# Patient Record
Sex: FEMALE | Race: WHITE | Employment: UNEMPLOYED | URBAN - METROPOLITAN AREA
[De-identification: names, ages, dates, MRNs, and addresses within clinical notes are randomized per-mention and may not be internally consistent; named-entity substitution may affect disease eponyms.]

---

## 2019-04-05 LAB
ALBUMIN SERPL-MCNC: NORMAL G/DL
ALP BLD-CCNC: NORMAL U/L
ALT SERPL-CCNC: NORMAL U/L
ANION GAP SERPL CALCULATED.3IONS-SCNC: NORMAL MMOL/L
AST SERPL-CCNC: NORMAL U/L
AVERAGE GLUCOSE: NORMAL
BILIRUB SERPL-MCNC: NORMAL MG/DL
BUN BLDV-MCNC: NORMAL MG/DL
CALCIUM SERPL-MCNC: NORMAL MG/DL
CHLORIDE BLD-SCNC: NORMAL MMOL/L
CHOLESTEROL, TOTAL: NORMAL
CHOLESTEROL/HDL RATIO: NORMAL
CO2: NORMAL
CREAT SERPL-MCNC: NORMAL MG/DL
GFR CALCULATED: NORMAL
GLUCOSE BLD-MCNC: NORMAL MG/DL
HBA1C MFR BLD: 5.1 %
HDLC SERPL-MCNC: NORMAL MG/DL
LDL CHOLESTEROL CALCULATED: NORMAL
POTASSIUM SERPL-SCNC: NORMAL MMOL/L
SODIUM BLD-SCNC: NORMAL MMOL/L
TOTAL PROTEIN: NORMAL
TRIGL SERPL-MCNC: NORMAL MG/DL
VLDLC SERPL CALC-MCNC: NORMAL MG/DL

## 2019-05-06 ENCOUNTER — HOSPITAL ENCOUNTER (OUTPATIENT)
Dept: MAMMOGRAPHY | Age: 39
Discharge: HOME | End: 2019-05-08
Payer: OTHER GOVERNMENT

## 2019-05-06 DIAGNOSIS — Z12.31 VISIT FOR SCREENING MAMMOGRAM: ICD-10-CM

## 2019-05-06 PROCEDURE — 77063 BREAST TOMOSYNTHESIS BI: CPT

## 2019-05-07 ENCOUNTER — TELEPHONE (OUTPATIENT)
Dept: FAMILY MEDICINE CLINIC | Age: 39
End: 2019-05-07

## 2019-05-07 NOTE — TELEPHONE ENCOUNTER
----- Message from Max Garcia DO sent at 5/7/2019  1:14 PM EDT -----  Mammogram normal ok to notify her

## 2019-11-20 RX ORDER — B-COMPLEX WITH VITAMIN C
TABLET ORAL DAILY
COMMUNITY
End: 2021-09-29

## 2019-11-20 RX ORDER — ASCORBIC ACID 500 MG
500 TABLET ORAL 2 TIMES DAILY
COMMUNITY
End: 2019-12-03

## 2019-11-20 RX ORDER — OXYBUTYNIN CHLORIDE 5 MG/1
5 TABLET ORAL 2 TIMES DAILY
COMMUNITY
End: 2019-12-03

## 2019-11-20 RX ORDER — VITAMIN B COMPLEX
1000 TABLET ORAL DAILY
COMMUNITY
End: 2022-06-28

## 2019-12-03 ENCOUNTER — OFFICE VISIT (OUTPATIENT)
Dept: PODIATRY | Age: 39
End: 2019-12-03
Payer: OTHER GOVERNMENT

## 2019-12-03 VITALS — BODY MASS INDEX: 21.19 KG/M2 | HEIGHT: 67 IN | WEIGHT: 135 LBS

## 2019-12-03 DIAGNOSIS — M76.61 ACHILLES TENDINITIS, RIGHT LEG: ICD-10-CM

## 2019-12-03 DIAGNOSIS — M79.671 RIGHT FOOT PAIN: Primary | ICD-10-CM

## 2019-12-03 DIAGNOSIS — M72.2 PLANTAR FASCIITIS: ICD-10-CM

## 2019-12-03 PROCEDURE — 99203 OFFICE O/P NEW LOW 30 MIN: CPT | Performed by: PODIATRIST

## 2019-12-03 PROCEDURE — L4396 STATIC OR DYNAMI AFO PRE CST: HCPCS | Performed by: PODIATRIST

## 2019-12-03 RX ORDER — MELOXICAM 15 MG/1
15 TABLET ORAL DAILY
Qty: 30 TABLET | Refills: 0 | Status: SHIPPED
Start: 2019-12-03 | End: 2020-05-28

## 2019-12-17 ENCOUNTER — OFFICE VISIT (OUTPATIENT)
Dept: PODIATRY | Age: 39
End: 2019-12-17
Payer: OTHER GOVERNMENT

## 2019-12-17 VITALS — BODY MASS INDEX: 21.19 KG/M2 | WEIGHT: 135 LBS | HEIGHT: 67 IN

## 2019-12-17 DIAGNOSIS — M72.2 PLANTAR FASCIITIS: Primary | ICD-10-CM

## 2019-12-17 DIAGNOSIS — M79.671 RIGHT FOOT PAIN: ICD-10-CM

## 2019-12-17 DIAGNOSIS — G57.91 NERVE ENTRAPMENT SYNDROME OF RIGHT FOOT: ICD-10-CM

## 2019-12-17 PROCEDURE — 99213 OFFICE O/P EST LOW 20 MIN: CPT | Performed by: PODIATRIST

## 2020-01-07 ENCOUNTER — OFFICE VISIT (OUTPATIENT)
Dept: PODIATRY | Age: 40
End: 2020-01-07
Payer: OTHER GOVERNMENT

## 2020-01-07 PROCEDURE — 99213 OFFICE O/P EST LOW 20 MIN: CPT | Performed by: PODIATRIST

## 2020-01-07 NOTE — PROGRESS NOTES
Patient in office to follow up with right heel pain. Continues to have pain to right heel but states it is not as bad as it was. Uses night splint at night. 12/3/19  Kayleigh Cline : 1980 Sex: female  Age: 35 y.o. Patient was referred by No primary care provider on file. CC:    Follow-up painful right heel and nerve pain on top of the right foot    HPI:   Follow-up right plantar fasciitis. Continues night splint has noted significant improvement. States at least 80% improved from last month. States she does not have as much nerve pain on top of the right foot since loosening the strap on the night splint. Does have oral Mobic which she has been taking only as needed at this time. States still having pain when she is barefoot. States first few steps in the morning significant decrease in overall pain pleased with overall progression. No additional pedal complaints at this time. ROS:  Const: Denies constitutional symptoms  Musculo: Denies symptoms other than stated above  Skin: Denies symptoms other than stated above       Current Outpatient Medications:     meloxicam (MOBIC) 15 MG tablet, Take 1 tablet by mouth daily for 30 doses, Disp: 30 tablet, Rfl: 0    Probiotic Product (PROBIOTIC PO), Take by mouth daily 1 by mouth everyday, Disp: , Rfl:     Vitamin D (CHOLECALCIFEROL) 25 MCG (1000 UT) TABS tablet, Take 1,000 Units by mouth daily 3 to 5 by mouth everyday, Disp: , Rfl:     B Complex Vitamins (VITAMIN B COMPLEX) TABS, Take by mouth daily 1 by mouth everyday vitamin b12 deficiency, Disp: , Rfl:   No Known Allergies    Past Medical History:   Diagnosis Date    HPV (human papilloma virus) anogenital infection     Positive    Hypothyroidism     Incontinence     Urge    Inguinal hernia 2013    Bilateral Repair with Umbilical Hernia Repair            There were no vitals filed for this visit. 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     Focused Lower Extremity Physical Exam:    Neurovascular examination:    Dorsalis Pedis palpable bilateral.  Posterior tibialis palpable bilateral.    Capillary Refill Time:  Immediate return  Hair growth:  Symmetrical and bilateral   Skin:  Not atrophic  Edema: No edema bilateral feet or ankles. Neurologic:  Light touch intact bilateral.   Negative Tinel's dorsal medial cutaneous nerve right great toe at the level of the first and second tarsometatarsal joint right foot. Musculoskeletal/ Orthopedic examination:    Equinis: Absent bilateral  Dorsiflexion, plantarflexion, inversion, eversion bilateral 5 out of 5 muscle strength  Wiggling toes  Negative Homans  Tenderness insertion right Achilles tendon. Negative Marcelo. Mild tenderness medial and central tubercle right plantar fascial.  Negative calcaneal squeeze test.    Dermatology examination:    No open skin lesions or abrasions bilateral lower extremity. Assessment and Plan:  Akiko Arciniega was seen today for follow-up and foot pain. Diagnoses and all orders for this visit:    Plantar fasciitis    Right foot pain    Nerve entrapment syndrome of right foot      Follow-up right plantar fasciitis  Continue night splint at night. Proper instructions. They recommend wearing a sock underneath the strap to avoid nerve compression. Avoid tight fitting shoes. Continue oral Mobic 15 mg once daily as directed for the next week. Risk and benefits long-term anti-inflammatories discussed in detail. I will follow-up as needed. Patient has progressed very well. Did discuss corticosteroid injection right plantar fascia if continued symptoms. I will follow-up as needed. Return if symptoms worsen or fail to improve. Seen By:  Trevor Bella DPM      Document was created using voice recognition software. Note was reviewed, however may contain grammatical errors.

## 2020-05-28 ENCOUNTER — VIRTUAL VISIT (OUTPATIENT)
Dept: FAMILY MEDICINE CLINIC | Age: 40
End: 2020-05-28
Payer: OTHER GOVERNMENT

## 2020-05-28 VITALS — HEIGHT: 68 IN | BODY MASS INDEX: 20.53 KG/M2

## 2020-05-28 PROCEDURE — 99213 OFFICE O/P EST LOW 20 MIN: CPT | Performed by: FAMILY MEDICINE

## 2020-05-28 RX ORDER — PREDNISONE 10 MG/1
20 TABLET ORAL 2 TIMES DAILY
Qty: 20 TABLET | Refills: 0 | Status: SHIPPED | OUTPATIENT
Start: 2020-05-28 | End: 2020-06-02

## 2020-05-28 ASSESSMENT — ENCOUNTER SYMPTOMS
ABDOMINAL PAIN: 0
WHEEZING: 0
CHEST TIGHTNESS: 0
NAUSEA: 0
DIARRHEA: 0
SINUS PAIN: 0
EYE PAIN: 0
BACK PAIN: 0
SORE THROAT: 0
VOMITING: 0
CONSTIPATION: 0
TROUBLE SWALLOWING: 0
COUGH: 0
SHORTNESS OF BREATH: 0

## 2020-05-28 NOTE — PROGRESS NOTES
20    Name: Rodney Almanza  :1980   Sex:female   Age:40 y.o. Chief Complaint   Patient presents with    Rash     Patient presents from home for virtual visit. She says she pulled poison ivy about a week ago. She now has a rash on arms and shoulders. She was taking Zyrtec with no relief. Patient denies shortness of breath, tongue swelling, and difficulty breathing. TeleMedicine Video Visit    This visit was performed as a virtual video visit using a synchronous, two-way, audio-video telehealth technology platform. Patient identification was verified at the start of the visit, including the patient's telephone number and physical location. I discussed with the patient the nature of our telehealth visits, that:     Due to the nature of an audio- video modality, the only components of a physical exam that could be done are the elements supported by direct observation. I would evaluate the patient and recommend diagnostics and treatments based on my assessment. If it was felt that the patient should be evaluated in clinic or an emergency room setting, then they would be directed there. Our sessions are not being recorded and that personal health information is protected. Our team would provide follow up care in person if/when the patient needs it. Patient does agree to proceed with telemedicine consultation. Patient's location: home address in [place] [address]  location other address in [facility] office,  other people involved in call were Deangelo Medellin and my     See below for progress note    Time spent: Greater than Not billed by time    This visit was completed virtually using Doxy. me    Was weeding and gardening last week and was outside for over 4hrs 2 days in a row  Was unable to break o was and is pretty sure she was pulling poison ivy out  2 days ago started with rash on both wrists but now redness and fine red rash going up both arms  Very itchy and zyrtec not helping at all        Review of Systems   Constitutional: Negative for appetite change, fatigue and fever. HENT: Negative for congestion, ear pain, sinus pain, sore throat and trouble swallowing. Eyes: Negative for pain. Respiratory: Negative for cough, chest tightness, shortness of breath and wheezing. Cardiovascular: Negative for chest pain, palpitations and leg swelling. Gastrointestinal: Negative for abdominal pain, constipation, diarrhea, nausea and vomiting. Endocrine: Negative for cold intolerance and heat intolerance. Genitourinary: Negative for difficulty urinating, dysuria, frequency, hematuria, pelvic pain and urgency. Musculoskeletal: Negative for arthralgias, back pain, gait problem, joint swelling and myalgias. Skin: Positive for rash. Negative for wound. Neurological: Negative for dizziness, syncope, light-headedness and headaches. Hematological: Negative for adenopathy. Psychiatric/Behavioral: Negative for confusion, dysphoric mood, self-injury, sleep disturbance and suicidal ideas. The patient is not nervous/anxious. Current Outpatient Medications:     predniSONE (DELTASONE) 10 MG tablet, Take 2 tablets by mouth 2 times daily for 5 days, Disp: 20 tablet, Rfl: 0    Probiotic Product (PROBIOTIC PO), Take by mouth daily 1 by mouth everyday, Disp: , Rfl:     Vitamin D (CHOLECALCIFEROL) 25 MCG (1000 UT) TABS tablet, Take 1,000 Units by mouth daily 3 to 5 by mouth everyday, Disp: , Rfl:     B Complex Vitamins (VITAMIN B COMPLEX) TABS, Take by mouth daily 1 by mouth everyday vitamin b12 deficiency, Disp: , Rfl:   No Known Allergies   Past Medical History:   Diagnosis Date    HPV (human papilloma virus) anogenital infection     Positive    Hypothyroidism     Incontinence     Urge    Inguinal hernia 03/2013    Bilateral Repair with Umbilical Hernia Repair      There are no active problems to display for this patient.      Past Surgical History:   Procedure Laterality Date   

## 2020-05-29 ENCOUNTER — NURSE ONLY (OUTPATIENT)
Dept: FAMILY MEDICINE CLINIC | Age: 40
End: 2020-05-29
Payer: OTHER GOVERNMENT

## 2020-05-29 PROCEDURE — 96372 THER/PROPH/DIAG INJ SC/IM: CPT | Performed by: FAMILY MEDICINE

## 2020-05-29 RX ORDER — METHYLPREDNISOLONE ACETATE 80 MG/ML
80 INJECTION, SUSPENSION INTRA-ARTICULAR; INTRALESIONAL; INTRAMUSCULAR; SOFT TISSUE ONCE
Status: COMPLETED | OUTPATIENT
Start: 2020-05-29 | End: 2020-05-29

## 2020-05-29 RX ADMIN — METHYLPREDNISOLONE ACETATE 80 MG: 80 INJECTION, SUSPENSION INTRA-ARTICULAR; INTRALESIONAL; INTRAMUSCULAR; SOFT TISSUE at 10:30

## 2020-05-29 ASSESSMENT — PATIENT HEALTH QUESTIONNAIRE - PHQ9
SUM OF ALL RESPONSES TO PHQ9 QUESTIONS 1 & 2: 0
SUM OF ALL RESPONSES TO PHQ QUESTIONS 1-9: 0
2. FEELING DOWN, DEPRESSED OR HOPELESS: 0
SUM OF ALL RESPONSES TO PHQ QUESTIONS 1-9: 0
1. LITTLE INTEREST OR PLEASURE IN DOING THINGS: 0

## 2021-03-08 ENCOUNTER — OFFICE VISIT (OUTPATIENT)
Dept: FAMILY MEDICINE CLINIC | Age: 41
End: 2021-03-08
Payer: OTHER GOVERNMENT

## 2021-03-08 VITALS
SYSTOLIC BLOOD PRESSURE: 122 MMHG | OXYGEN SATURATION: 98 % | BODY MASS INDEX: 21.59 KG/M2 | DIASTOLIC BLOOD PRESSURE: 78 MMHG | HEART RATE: 78 BPM | TEMPERATURE: 97.8 F | WEIGHT: 142 LBS

## 2021-03-08 DIAGNOSIS — S93.492A SPRAIN OF ANTERIOR TALOFIBULAR LIGAMENT OF LEFT ANKLE, INITIAL ENCOUNTER: Primary | ICD-10-CM

## 2021-03-08 PROCEDURE — 99213 OFFICE O/P EST LOW 20 MIN: CPT | Performed by: FAMILY MEDICINE

## 2021-03-08 RX ORDER — M-VIT,TX,IRON,MINS/CALC/FOLIC 27MG-0.4MG
1 TABLET ORAL DAILY
COMMUNITY
End: 2021-09-29

## 2021-03-08 RX ORDER — IBUPROFEN 600 MG/1
300 TABLET ORAL EVERY 6 HOURS PRN
Qty: 60 TABLET | Refills: 1 | Status: SHIPPED
Start: 2021-03-08 | End: 2021-09-29

## 2021-03-08 RX ORDER — METHYLPREDNISOLONE 4 MG/1
TABLET ORAL
Qty: 1 KIT | Refills: 0 | Status: SHIPPED
Start: 2021-03-08 | End: 2021-09-29

## 2021-03-08 ASSESSMENT — ENCOUNTER SYMPTOMS: RESPIRATORY NEGATIVE: 1

## 2021-03-08 NOTE — PROGRESS NOTES
3/8/21  August  : 1980 Sex: female  Age: 42 y.o. Chief Complaint   Patient presents with    Ankle Pain     left, happened 8 weeks ago       [facility] [place] is a 79-year-old white female who is in [facility] today with chief complaint of pain in the left ankle that happened approximately 2 months ago when she was coming down the steps and fell to her left ankle pop. She has had no significant improvement since injury. She is ambulatory but has tenderness over the anterior tibial ligament. Review of Systems   Constitutional: Negative. HENT: Negative. Respiratory: Negative. Cardiovascular: Negative. Musculoskeletal: Positive for arthralgias, gait problem and joint swelling. Skin: Negative. Current Outpatient Medications:     Multiple Vitamins-Minerals (THERAPEUTIC MULTIVITAMIN-MINERALS) tablet, Take 1 tablet by mouth daily, Disp: , Rfl:     methylPREDNISolone (MEDROL DOSEPACK) 4 MG tablet, Take by mouth., Disp: 1 kit, Rfl: 0    ibuprofen (ADVIL;MOTRIN) 600 MG tablet, Take 0.5 tablets by mouth every 6 hours as needed for Pain, Disp: 60 tablet, Rfl: 1    Probiotic Product (PROBIOTIC PO), Take by mouth daily 1 by mouth everyday, Disp: , Rfl:     Vitamin D (CHOLECALCIFEROL) 25 MCG (1000 UT) TABS tablet, Take 1,000 Units by mouth daily 3 to 5 by mouth everyday, Disp: , Rfl:     B Complex Vitamins (VITAMIN B COMPLEX) TABS, Take by mouth daily 1 by mouth everyday vitamin b12 deficiency, Disp: , Rfl:   No Known Allergies    Past Medical History:   Diagnosis Date    HPV (human papilloma virus) anogenital infection     Positive    Hypothyroidism     Incontinence     Urge    Inguinal hernia 2013    Bilateral Repair with Umbilical Hernia Repair      Past Surgical History:   Procedure Laterality Date    LEEP      Due to cervical cancer     No family history on file.   Social History     Tobacco Use    Smoking status: Never Smoker    Smokeless tobacco: Never Used   Substance Use Topics    Alcohol use: Yes     Comment: Occasionally 3 drinks per week    Drug use: Never        Vitals:    03/08/21 1103   BP: 122/78   Pulse: 78   Temp: 97.8 °F (36.6 °C)   SpO2: 98%   Weight: 142 lb (64.4 kg)       Physical Exam  Vitals signs reviewed. Constitutional:       Appearance: Normal appearance. HENT:      Head: Normocephalic and atraumatic. Cardiovascular:      Rate and Rhythm: Normal rate and regular rhythm. Pulmonary:      Effort: Pulmonary effort is normal.      Breath sounds: Normal breath sounds. Musculoskeletal:         General: Swelling, tenderness and signs of injury present. Left ankle: She exhibits swelling. Tenderness. Lateral malleolus tenderness found. Achilles tendon normal.        Feet:    Skin:     General: Skin is warm and dry. Neurological:      Mental Status: She is alert. Assessment and Plan:  Manjit Robertson was seen today for ankle pain. Diagnoses and all orders for this visit:    Sprain of anterior talofibular ligament of left ankle, initial encounter  -     XR ANKLE LEFT (MIN 3 VIEWS); Future    Other orders  -     methylPREDNISolone (MEDROL DOSEPACK) 4 MG tablet; Take by mouth.  -     ibuprofen (ADVIL;MOTRIN) 600 MG tablet; Take 0.5 tablets by mouth every 6 hours as needed for Pain        Orders Placed This Encounter   Medications    methylPREDNISolone (MEDROL DOSEPACK) 4 MG tablet     Sig: Take by mouth. Dispense:  1 kit     Refill:  0    ibuprofen (ADVIL;MOTRIN) 600 MG tablet     Sig: Take 0.5 tablets by mouth every 6 hours as needed for Pain     Dispense:  60 tablet     Refill:  1        Patient advised to follow up with PCP as needed. Seen By:  Oleksandr Meyer, DO  Graphic films have not been interpreted as of yet I did review them and is any evidence of fracture however we will wait until there reading. Organ to put her on an Aircast and start her on medication she is to follow-up with her PCP in 1 week.

## 2021-03-10 ENCOUNTER — OFFICE VISIT (OUTPATIENT)
Dept: FAMILY MEDICINE CLINIC | Age: 41
End: 2021-03-10
Payer: OTHER GOVERNMENT

## 2021-03-10 VITALS
HEIGHT: 67 IN | SYSTOLIC BLOOD PRESSURE: 110 MMHG | OXYGEN SATURATION: 98 % | TEMPERATURE: 98 F | HEART RATE: 74 BPM | BODY MASS INDEX: 21.97 KG/M2 | WEIGHT: 140 LBS | DIASTOLIC BLOOD PRESSURE: 60 MMHG

## 2021-03-10 DIAGNOSIS — S93.412D SPRAIN OF CALCANEOFIBULAR LIGAMENT OF LEFT ANKLE, SUBSEQUENT ENCOUNTER: ICD-10-CM

## 2021-03-10 DIAGNOSIS — E78.2 MIXED HYPERLIPIDEMIA: ICD-10-CM

## 2021-03-10 DIAGNOSIS — R92.8 ABNORMAL MAMMOGRAM: Primary | ICD-10-CM

## 2021-03-10 DIAGNOSIS — R35.0 URINARY FREQUENCY: ICD-10-CM

## 2021-03-10 DIAGNOSIS — Z00.00 ENCOUNTER FOR WELL ADULT EXAM WITHOUT ABNORMAL FINDINGS: ICD-10-CM

## 2021-03-10 DIAGNOSIS — E07.9 THYROID DISEASE: ICD-10-CM

## 2021-03-10 DIAGNOSIS — Z00.00 ENCOUNTER FOR WELL ADULT EXAM WITHOUT ABNORMAL FINDINGS: Primary | ICD-10-CM

## 2021-03-10 DIAGNOSIS — R73.01 IMPAIRED FASTING BLOOD SUGAR: ICD-10-CM

## 2021-03-10 DIAGNOSIS — Z12.31 ENCOUNTER FOR SCREENING MAMMOGRAM FOR BREAST CANCER: ICD-10-CM

## 2021-03-10 LAB
ALBUMIN SERPL-MCNC: 4.7 G/DL (ref 3.5–5.2)
ALP BLD-CCNC: 33 U/L (ref 35–104)
ALT SERPL-CCNC: 13 U/L (ref 0–32)
ANION GAP SERPL CALCULATED.3IONS-SCNC: 12 MMOL/L (ref 7–16)
AST SERPL-CCNC: 17 U/L (ref 0–31)
BASOPHILS ABSOLUTE: 0.06 E9/L (ref 0–0.2)
BASOPHILS RELATIVE PERCENT: 1 % (ref 0–2)
BILIRUB SERPL-MCNC: 0.5 MG/DL (ref 0–1.2)
BILIRUBIN, POC: NORMAL
BLOOD URINE, POC: NORMAL
BUN BLDV-MCNC: 9 MG/DL (ref 6–20)
CALCIUM SERPL-MCNC: 9.3 MG/DL (ref 8.6–10.2)
CHLORIDE BLD-SCNC: 104 MMOL/L (ref 98–107)
CHOLESTEROL, TOTAL: 168 MG/DL (ref 0–199)
CLARITY, POC: CLEAR
CO2: 25 MMOL/L (ref 22–29)
COLOR, POC: YELLOW
CREAT SERPL-MCNC: 0.8 MG/DL (ref 0.5–1)
EOSINOPHILS ABSOLUTE: 0.1 E9/L (ref 0.05–0.5)
EOSINOPHILS RELATIVE PERCENT: 1.7 % (ref 0–6)
GFR AFRICAN AMERICAN: >60
GFR NON-AFRICAN AMERICAN: >60 ML/MIN/1.73
GLUCOSE BLD-MCNC: 85 MG/DL (ref 74–99)
GLUCOSE URINE, POC: NORMAL
HBA1C MFR BLD: 4.9 % (ref 4–5.6)
HCT VFR BLD CALC: 42.8 % (ref 34–48)
HDLC SERPL-MCNC: 55 MG/DL
HEMOGLOBIN: 13.7 G/DL (ref 11.5–15.5)
IMMATURE GRANULOCYTES #: 0.01 E9/L
IMMATURE GRANULOCYTES %: 0.2 % (ref 0–5)
KETONES, POC: NORMAL
LDL CHOLESTEROL CALCULATED: 101 MG/DL (ref 0–99)
LEUKOCYTE EST, POC: NORMAL
LYMPHOCYTES ABSOLUTE: 1.44 E9/L (ref 1.5–4)
LYMPHOCYTES RELATIVE PERCENT: 25.1 % (ref 20–42)
MCH RBC QN AUTO: 30 PG (ref 26–35)
MCHC RBC AUTO-ENTMCNC: 32 % (ref 32–34.5)
MCV RBC AUTO: 93.9 FL (ref 80–99.9)
MONOCYTES ABSOLUTE: 0.33 E9/L (ref 0.1–0.95)
MONOCYTES RELATIVE PERCENT: 5.7 % (ref 2–12)
NEUTROPHILS ABSOLUTE: 3.8 E9/L (ref 1.8–7.3)
NEUTROPHILS RELATIVE PERCENT: 66.3 % (ref 43–80)
NITRITE, POC: NORMAL
PDW BLD-RTO: 13.4 FL (ref 11.5–15)
PH, POC: 6.5
PLATELET # BLD: 261 E9/L (ref 130–450)
PMV BLD AUTO: 9.9 FL (ref 7–12)
POTASSIUM SERPL-SCNC: 4 MMOL/L (ref 3.5–5)
PROTEIN, POC: NORMAL
RBC # BLD: 4.56 E12/L (ref 3.5–5.5)
SODIUM BLD-SCNC: 141 MMOL/L (ref 132–146)
SPECIFIC GRAVITY, POC: 1.01
T4 FREE: 1.51 NG/DL (ref 0.93–1.7)
TOTAL PROTEIN: 7.4 G/DL (ref 6.4–8.3)
TRIGL SERPL-MCNC: 61 MG/DL (ref 0–149)
TSH SERPL DL<=0.05 MIU/L-ACNC: 0.32 UIU/ML (ref 0.27–4.2)
UROBILINOGEN, POC: 0.2
VLDLC SERPL CALC-MCNC: 12 MG/DL
WBC # BLD: 5.7 E9/L (ref 4.5–11.5)

## 2021-03-10 PROCEDURE — 81002 URINALYSIS NONAUTO W/O SCOPE: CPT | Performed by: FAMILY MEDICINE

## 2021-03-10 PROCEDURE — 99396 PREV VISIT EST AGE 40-64: CPT | Performed by: FAMILY MEDICINE

## 2021-03-10 RX ORDER — MAGNESIUM GLUCONATE 27 MG(500)
200 TABLET ORAL NIGHTLY
COMMUNITY
End: 2021-09-29

## 2021-03-10 ASSESSMENT — ENCOUNTER SYMPTOMS
BACK PAIN: 0
DIARRHEA: 0
WHEEZING: 0
ABDOMINAL PAIN: 0
CONSTIPATION: 0
VOMITING: 0
COUGH: 0
SORE THROAT: 0
SHORTNESS OF BREATH: 0
NAUSEA: 0
TROUBLE SWALLOWING: 0
EYE PAIN: 0
CHEST TIGHTNESS: 0
SINUS PAIN: 0

## 2021-03-10 ASSESSMENT — PATIENT HEALTH QUESTIONNAIRE - PHQ9
SUM OF ALL RESPONSES TO PHQ QUESTIONS 1-9: 0
2. FEELING DOWN, DEPRESSED OR HOPELESS: 0
SUM OF ALL RESPONSES TO PHQ QUESTIONS 1-9: 0
SUM OF ALL RESPONSES TO PHQ9 QUESTIONS 1 & 2: 0
1. LITTLE INTEREST OR PLEASURE IN DOING THINGS: 0

## 2021-03-10 NOTE — PROGRESS NOTES
3/10/21    Name: Noel Alatorre  :1980   Sex:female   Age:41 y.o. Chief Complaint   Patient presents with    Annual Exam     Patient presents to office for visit. She is here for her yearly check up. Patient was seen through express two days ago for left ankle pain. About 9 weeks ago patient rolled her ankle, she heard a pop, and the ankle swelled and had bruising. She now has a burning in the left heel/ankle area. Patient has limited mobility in the left ankle, and it is quite painful. She has not picked up the steroid pack sent in by express doctor yet. Patient says her menses are abnormal, she thinks she may be close to menopause. Patient says her periods are coming sooner each month and the bleeding is very heavy with large clots for about 3 days. She says she feels cold all of the time. Patient is fasting this morning. She does have stress incontinence when sneezing, she says this is not new. Patient here for yearly check up    Doing well over all  Periods still occur every month but they are shorted and a little heavier but only 3 to 4 days long  Not perimenopause b/c they are still regular  Not spotting inbetween though    Twisted ankle almost 2 months ago and was in on express but the ankle still has pain on the outside just below the bone that sticks out per the patient  Swells sometimes by the end of the day  Initially she iced it and took some motrin but now concerned b/c she is still having the pain and mild swelling  We can refer to podiatry  xrays done earlier this week    She has been donatng blood  But now complains of feeling cold a lot  Will haveher stop donated for a few months and see if that feeling resolves, it is like from her donating every 2 months        Review of Systems   Constitutional: Negative for appetite change, fatigue and fever. HENT: Negative for congestion, ear pain, sinus pain, sore throat and trouble swallowing. Eyes: Negative for pain.    Respiratory: Negative Umbilical Hernia Repair      There are no active problems to display for this patient. Past Surgical History:   Procedure Laterality Date    LEEP  2004    Due to cervical cancer      Social History     Tobacco History     Smoking Status  Never Smoker    Smokeless Tobacco Use  Never Used          Alcohol History     Alcohol Use Status  Yes Comment  Occasionally 3 drinks per week          Drug Use     Drug Use Status  Never          Sexual Activity     Sexually Active  Not Asked            /60   Pulse 74   Temp 98 °F (36.7 °C)   Ht 5' 7\" (1.702 m)   Wt 140 lb (63.5 kg)   LMP 03/09/2021 (Exact Date)   SpO2 98%   BMI 21.93 kg/m²     EXAM:   Physical Exam  Vitals signs and nursing note reviewed. Constitutional:       General: She is not in acute distress. Appearance: Normal appearance. She is well-developed. She is not ill-appearing. HENT:      Head: Normocephalic and atraumatic. Right Ear: Tympanic membrane normal.      Left Ear: Tympanic membrane normal.      Nose: Nose normal.      Mouth/Throat:      Mouth: Mucous membranes are moist.   Eyes:      Pupils: Pupils are equal, round, and reactive to light. Neck:      Musculoskeletal: Normal range of motion. Cardiovascular:      Rate and Rhythm: Normal rate and regular rhythm. Pulmonary:      Effort: Pulmonary effort is normal.      Breath sounds: Normal breath sounds. Abdominal:      General: Bowel sounds are normal.      Palpations: Abdomen is soft. Musculoskeletal:      Comments: Gait nromal, left ankle tender over lateral maleolus just anterior and low possibly over the calcaneofibular ligament, mild swelling today   Skin:     General: Skin is warm and dry. Neurological:      General: No focal deficit present. Mental Status: She is alert and oriented to person, place, and time. Psychiatric:         Mood and Affect: Mood normal.         Thought Content:  Thought content normal.          Oswald Corona was seen today for annual exam.    Diagnoses and all orders for this visit:    Encounter for well adult exam without abnormal findings  Comments:  labs today  mammogram ordered  referral in chart as well  Orders:  -     CBC Auto Differential; Future  -     Comprehensive Metabolic Panel; Future    Sprain of calcaneofibular ligament of left ankle, subsequent encounter  Comments:  refer to Dr Nuno Gilbert, may need a walking boot  Orders:  -     Gibson Summers DPM, Podiatry, [place]    Encounter for screening mammogram for breast cancer  Comments:  mammogram ordered  Orders:  -     [facility] GABY DIGITAL SCREEN BILATERAL PER PROTOCOL; Future    Mixed hyperlipidemia  Comments:  labs today  Orders:  -     Lipid Panel; Future    Impaired fasting blood sugar  -     Hemoglobin A1C; Future    Thyroid disease  -     TSH without Reflex; Future  -     T4, Free; Future    Urinary frequency  Comments:  ua today  Orders:  -     POCT Urinalysis no Micro        I independently reviewed and updated the chief complaint, HPI, past medical and surgical history, medications, allergies and ROS as entered by the LPN. Seen by:   April Prieto DO

## 2021-03-11 ENCOUNTER — TELEPHONE (OUTPATIENT)
Dept: GENERAL RADIOLOGY | Age: 41
End: 2021-03-11

## 2021-03-11 NOTE — TELEPHONE ENCOUNTER
Call to patient in reference to her mammogram performed at [facility] on March 10, 2021. Instructed patient that the radiologist has recommended some additional breast imaging  in order to make a final determination/result. Additional imaging to be completed at [facility]. Verbalizes understanding and is agreeable to proceed. Patient requested 3-22 due to work schedule.        Veronica Mcgraw RN, BSN, [address]

## 2021-03-18 ENCOUNTER — OFFICE VISIT (OUTPATIENT)
Dept: PODIATRY | Age: 41
End: 2021-03-18
Payer: OTHER GOVERNMENT

## 2021-03-18 DIAGNOSIS — M25.372 ANKLE INSTABILITY, LEFT: ICD-10-CM

## 2021-03-18 DIAGNOSIS — S93.492D SPRAIN OF ANTERIOR TALOFIBULAR LIGAMENT OF LEFT ANKLE, SUBSEQUENT ENCOUNTER: Primary | ICD-10-CM

## 2021-03-18 DIAGNOSIS — S99.912D INJURY OF LEFT ANKLE, SUBSEQUENT ENCOUNTER: ICD-10-CM

## 2021-03-18 PROCEDURE — 99213 OFFICE O/P EST LOW 20 MIN: CPT | Performed by: PODIATRIST

## 2021-03-18 NOTE — PROGRESS NOTES
Patient in office with c/o left ankle pain. Seen in express care for left ankle injury 21. Injury occurred 10 weeks ago. Miranda Dancer a pop when she twisted her ankle on a stick. Has had pain and limited motion since. Xray obtained at apt. Latrice Henny : 1980 Sex: female  Age: 42 y.o. Patient was referred by Dalila Anthony DO    CC:    Injury left ankle    HPI:   Presents today injury left ankle. Did sustain inversion type ankle sprain on the bottom step when she was walking out of her house approximately 10 weeks ago. Did have radiographs 3/8/2021 left ankle showing no fractures. Does have tenderness still in the outside of the left ankle. Has been wearing regular shoe. Does have over-the-counter ankle supported wrap which does seem to help some. Was prescribed oral steroid but did not fill it. No additional pedal complaints.     ROS:  Const: Denies constitutional symptoms  Musculo: Denies symptoms other than stated above  Skin: Denies symptoms other than stated above       Current Outpatient Medications:     magnesium gluconate (MAGONATE) 500 MG tablet, Take 200 mg by mouth nightly, Disp: , Rfl:     Multiple Vitamins-Minerals (THERAPEUTIC MULTIVITAMIN-MINERALS) tablet, Take 1 tablet by mouth daily, Disp: , Rfl:     methylPREDNISolone (MEDROL DOSEPACK) 4 MG tablet, Take by mouth., Disp: 1 kit, Rfl: 0    ibuprofen (ADVIL;MOTRIN) 600 MG tablet, Take 0.5 tablets by mouth every 6 hours as needed for Pain, Disp: 60 tablet, Rfl: 1    Probiotic Product (PROBIOTIC PO), Take by mouth daily 1 by mouth everyday, Disp: , Rfl:     Vitamin D (CHOLECALCIFEROL) 25 MCG (1000 UT) TABS tablet, Take 1,000 Units by mouth daily 3 to 5 by mouth everyday, Disp: , Rfl:     B Complex Vitamins (VITAMIN B COMPLEX) TABS, Take by mouth daily 1 by mouth everyday vitamin b12 deficiency, Disp: , Rfl:   No Known Allergies    Past Medical History:   Diagnosis Date    HPV (human papilloma virus) anogenital infection Positive    Hypothyroidism     Incontinence     Urge    Inguinal hernia 03/2013    Bilateral Repair with Umbilical Hernia Repair            There were no vitals filed for this visit. 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 Focused Lower Extremity Physical Exam:    Neurovascular examination:    Dorsalis Pedis palpable bilateral.  Posterior tibialis palpable bilateral.    Capillary Refill Time:  Immediate return  Hair growth:  Symmetrical and bilateral   Skin:  Not atrophic  Edema: No edema bilateral feet or ankles. Neurologic:  Light touch intact bilateral.       Musculoskeletal/ Orthopedic examination:    Equinis: Absent bilateral  Dorsiflexion, plantarflexion, inversion, eversion bilateral 5 out of 5 muscle strength  Wiggling toes  Negative Homans  Tenderness to palpation overlying ATFL left ankle. Positive anterior drawer. No tenderness overlying CFL or PT FL left ankle. No pain insertion Achilles tendon. Negative calcaneal squeeze test.  Negative tib-fib squeeze test.    Dermatology examination:    No open skin lesions or abrasions bilateral lower extremity. Mild tenderness overlying ATFL left ankle. Assessment and Plan:  Qamar Ferro was seen today for ankle pain. Diagnoses and all orders for this visit:    Sprain of anterior talofibular ligament of left ankle, subsequent encounter    Ankle instability, left    Injury of left ankle, subsequent encounter      Left ankle injury. Approximately 10 weeks following inversion type ankle sprain. Did review radiographs 3 view left ankle from 3/8/2021. No acute fracture dislocation noted. I did recommend Ace bandage during the day. I did apply Ace wrap today. She does have a night splint from previous plantar fasciitis on the right heel I did recommend using night splint in the evening. We did discuss walking boot.   If continued symptoms I did discuss MRI left ankle to rule out tear lateral ankle complex left ankle.  She did have Medrol Dosepak prescribed but did not fill it. I did recommend filling the Medrol Dosepak and take as directed for the next several days. I will follow-up 3 weeks. Return in about 3 weeks (around 4/8/2021). Seen By:  Bertha Chang DPM      Document was created using voice recognition software. Note was reviewed, however may contain grammatical errors.

## 2021-03-22 ENCOUNTER — HOSPITAL ENCOUNTER (OUTPATIENT)
Dept: GENERAL RADIOLOGY | Age: 41
Discharge: HOME | End: 2021-03-24
Payer: OTHER GOVERNMENT

## 2021-03-22 DIAGNOSIS — R92.8 ABNORMAL MAMMOGRAM OF LEFT BREAST: ICD-10-CM

## 2021-03-22 DIAGNOSIS — R92.8 ABNORMAL MAMMOGRAM: ICD-10-CM

## 2021-03-22 PROCEDURE — 77065 DX MAMMO INCL CAD UNI: CPT

## 2021-03-22 PROCEDURE — 76642 ULTRASOUND BREAST LIMITED: CPT

## 2021-09-17 ENCOUNTER — TELEPHONE (OUTPATIENT)
Dept: FAMILY MEDICINE CLINIC | Age: 41
End: 2021-09-17

## 2021-09-17 ENCOUNTER — OFFICE VISIT (OUTPATIENT)
Dept: FAMILY MEDICINE CLINIC | Age: 41
End: 2021-09-17
Payer: OTHER GOVERNMENT

## 2021-09-17 VITALS
BODY MASS INDEX: 21.01 KG/M2 | WEIGHT: 138.6 LBS | DIASTOLIC BLOOD PRESSURE: 72 MMHG | TEMPERATURE: 97.1 F | OXYGEN SATURATION: 96 % | SYSTOLIC BLOOD PRESSURE: 112 MMHG | HEART RATE: 86 BPM | HEIGHT: 68 IN | RESPIRATION RATE: 18 BRPM

## 2021-09-17 DIAGNOSIS — R06.2 WHEEZING: ICD-10-CM

## 2021-09-17 DIAGNOSIS — U07.1 COVID-19: Primary | ICD-10-CM

## 2021-09-17 LAB
INFLUENZA A ANTIBODY: NEGATIVE
INFLUENZA B ANTIBODY: NEGATIVE

## 2021-09-17 PROCEDURE — 87804 INFLUENZA ASSAY W/OPTIC: CPT | Performed by: PHYSICIAN ASSISTANT

## 2021-09-17 PROCEDURE — 99214 OFFICE O/P EST MOD 30 MIN: CPT | Performed by: PHYSICIAN ASSISTANT

## 2021-09-17 RX ORDER — ALBUTEROL SULFATE 90 UG/1
2 AEROSOL, METERED RESPIRATORY (INHALATION)
Qty: 18 G | Refills: 0 | Status: SHIPPED
Start: 2021-09-17 | End: 2022-06-28

## 2021-09-17 RX ORDER — AZITHROMYCIN 250 MG/1
TABLET, FILM COATED ORAL
COMMUNITY
Start: 2021-09-14 | End: 2021-09-29

## 2021-09-17 RX ORDER — DEXAMETHASONE 6 MG/1
TABLET ORAL
COMMUNITY
End: 2021-09-29

## 2021-09-17 RX ORDER — DEXTROMETHORPHAN HYDROBROMIDE AND PROMETHAZINE HYDROCHLORIDE 15; 6.25 MG/5ML; MG/5ML
5 SYRUP ORAL 4 TIMES DAILY PRN
Qty: 120 ML | Refills: 0 | Status: SHIPPED | OUTPATIENT
Start: 2021-09-17 | End: 2021-09-24

## 2021-09-17 RX ORDER — DEXAMETHASONE 4 MG/1
TABLET ORAL
COMMUNITY
Start: 2021-09-14 | End: 2021-09-29

## 2021-09-17 NOTE — PROGRESS NOTES
Chief Complaint       Fever, Sweats, Fatigue, Cough, and Generalized Body Aches      History of Present Illness   Source of history provided by:  patient. Toro Roman is a 42 y.o. old female presenting to the walk in clinic for reevaluation of persistent cough x 8 days. Pt was initially seen on 9/13/21 at [facility] and treated for suspected COVID-19 with a course of Zithromax and oral Decadron. Patient states that her rapid COVID-19 test was positive in office but her PCR test came back negative. She would like to be retested today. She is also requesting testing for influenza. Pt continues to complain of intermittent fevers (high of 103), fatigue, nonproductive cough, chest congestion, generalized body aches, headaches, and intermittent wheezing. Denies any CP, SOB, or GI symptoms. Denies any hx of asthma, COPD, or tobacco use. Patient states that her  is currently sick with a similar illness. She has not been vaccinated for COVID-19. ROS    Unless otherwise stated in this report or unable to obtain because of the patient's clinical or mental status as evidenced by the medical record, this patients's positive and negative responses for Review of Systems, constitutional, psych, eyes, ENT, cardiovascular, respiratory, gastrointestinal, neurological, genitourinary, musculoskeletal, integument systems and systems related to the presenting problem are either stated in the preceding or were not pertinent or were negative for the symptoms and/or complaints related to the medical problem. Past Medical History:  has a past medical history of HPV (human papilloma virus) anogenital infection, Hypothyroidism, Incontinence, and Inguinal hernia. Past Surgical History:  has a past surgical history that includes LEEP (2004). Social History:  reports that she has never smoked. She has never used smokeless tobacco. She reports current alcohol use. She reports that she does not use drugs.   Family History: family history is not on file. Allergies: Patient has no known allergies. Physical Exam         VS:  /72   Pulse 86   Temp 97.1 °F (36.2 °C)   Resp 18   Ht 5' 8\" (1.727 m)   Wt 138 lb 9.6 oz (62.9 kg)   SpO2 96%   BMI 21.07 kg/m²    Oxygen Saturation Interpretation: Normal.    Constitutional:  Alert, development consistent with age. Head: Mild TTP over the bilateral maxillary sinuses. Throat:  Mild posterior pharyngeal erythema with mild post nasal drip present. No exudate or tonsillar hypertrophy noted. Neck:  Supple. There is no anterior cervical adenopathy. Lungs: Faint end expiratory wheezes heard throughout the lung fields. No rales or rhonchi appreciated. Cough is harsh and wheezy. Patient is breathing comfortably on exam without any signs of respiratory distress noted. Heart:  Regular rate and rhythm, normal heart sounds, without pathological murmurs, ectopy, gallops, or rubs. Skin:  Normal turgor. Warm, dry, without visible rash. Neurological:  Alert and oriented. Motor functions intact. Responds to verbal commands. Lab / Imaging Results   (All laboratory and radiology results have been personally reviewed by myself)  Labs:  Results for orders placed or performed in visit on 09/17/21   POCT Influenza A/B   Result Value Ref Range    Influenza A Ab negative     Influenza B Ab negative        Imaging: All Radiology results interpreted by Radiologist unless otherwise noted. Assessment / Plan     Impression(s):  Tom Antonio was seen today for fever, sweats, fatigue, cough and generalized body aches. Diagnoses and all orders for this visit:    [id number]  -     COVID-19 Ambulatory; Future  -     POCT Influenza A/B  -     XR CHEST STANDARD (2 VW); Future  -     albuterol sulfate HFA (PROAIR HFA) 108 (90 Base) MCG/ACT inhaler;  Inhale 2 puffs into the lungs every 4-6 hours as needed for Wheezing or Shortness of Breath  -     promethazine-dextromethorphan (PROMETHAZINE-DM) 6.25-15 MG/5ML syrup; Take 5 mLs by mouth 4 times daily as needed for Cough    Wheezing  -     COVID-19 Ambulatory; Future  -     POCT Influenza A/B  -     XR CHEST STANDARD (2 VW); Future  -     albuterol sulfate HFA (PROAIR HFA) 108 (90 Base) MCG/ACT inhaler; Inhale 2 puffs into the lungs every 4-6 hours as needed for Wheezing or Shortness of Breath      Disposition:  Disposition: Discharge to home. Chest x-ray obtained in office today which came back within normal limits by my interpretation. Radiology did confirm this finding. Repeat COVID-19 PCR swab obtained today and pending, will call with results once available. Patient advised to continue to self quarantine at home as this is the most likely etiology for his symptoms. Rapid influenza testing is negative in office today. Prescription written for an albuterol inhaler and Promethazine DM cough syrup, side effect discussed. Patient advised to continue Zithromax and Decadron as prescribed. Continue to increase fluids and rest. Additional symptomatic relief discussed. F/u PCP in 5-7 days if symptoms persist. ED sooner if symptoms worsen or change. Red flag symptoms discussed. ED immediately with high or refractory fever, dyspnea, chest pain, palpitations, lower extremity edema, calf pain or swelling, lethargy, vomiting, or decreased urinary output. Pt is in agreement with this care plan. All questions answered. Dicky Kayser Spillan, PA-C    **This report was transcribed using voice recognition software. Every effort was made to ensure accuracy; however, inadvertent computerized transcription errors may be present.

## 2021-09-17 NOTE — TELEPHONE ENCOUNTER
She tested positive for Covid on Monday at [address] [facility] in [place]. They gave her a Z-pac and steroid. She is coughing a lot, and feels like she cant take a deep breath. She asking if you can give her a script for an inhaler? She would like that sent to [facility].

## 2021-09-19 ENCOUNTER — APPOINTMENT (OUTPATIENT)
Dept: GENERAL RADIOLOGY | Age: 41
End: 2021-09-19
Payer: OTHER GOVERNMENT

## 2021-09-19 ENCOUNTER — HOSPITAL ENCOUNTER (EMERGENCY)
Age: 41
Discharge: LEFT BEFORE DISCHARGE | End: 2021-09-19
Payer: OTHER GOVERNMENT

## 2021-09-19 VITALS
WEIGHT: 140 LBS | DIASTOLIC BLOOD PRESSURE: 60 MMHG | HEART RATE: 107 BPM | RESPIRATION RATE: 16 BRPM | BODY MASS INDEX: 21.29 KG/M2 | TEMPERATURE: 97.8 F | SYSTOLIC BLOOD PRESSURE: 120 MMHG | OXYGEN SATURATION: 94 %

## 2021-09-19 PROCEDURE — 4500000002 HC ER NO CHARGE

## 2021-09-19 PROCEDURE — 71046 X-RAY EXAM CHEST 2 VIEWS: CPT

## 2021-09-19 ASSESSMENT — PAIN DESCRIPTION - LOCATION: LOCATION: ABDOMEN

## 2021-09-19 ASSESSMENT — PAIN DESCRIPTION - DESCRIPTORS: DESCRIPTORS: ACHING

## 2021-09-19 ASSESSMENT — PAIN DESCRIPTION - FREQUENCY: FREQUENCY: CONTINUOUS

## 2021-09-19 ASSESSMENT — PAIN DESCRIPTION - ORIENTATION: ORIENTATION: RIGHT;LEFT

## 2021-09-19 ASSESSMENT — PAIN SCALES - GENERAL: PAINLEVEL_OUTOF10: 8

## 2021-09-21 LAB
SARS-COV-2: DETECTED
SOURCE: ABNORMAL

## 2021-09-29 ENCOUNTER — OFFICE VISIT (OUTPATIENT)
Dept: FAMILY MEDICINE CLINIC | Age: 41
End: 2021-09-29
Payer: OTHER GOVERNMENT

## 2021-09-29 VITALS
DIASTOLIC BLOOD PRESSURE: 80 MMHG | TEMPERATURE: 98.5 F | OXYGEN SATURATION: 92 % | HEIGHT: 67 IN | BODY MASS INDEX: 21.53 KG/M2 | SYSTOLIC BLOOD PRESSURE: 122 MMHG | HEART RATE: 112 BPM | WEIGHT: 137.2 LBS

## 2021-09-29 DIAGNOSIS — B02.9 HERPES ZOSTER WITHOUT COMPLICATION: ICD-10-CM

## 2021-09-29 DIAGNOSIS — J12.82 PNEUMONIA DUE TO COVID-19 VIRUS: Primary | ICD-10-CM

## 2021-09-29 DIAGNOSIS — U07.1 PNEUMONIA DUE TO COVID-19 VIRUS: Primary | ICD-10-CM

## 2021-09-29 DIAGNOSIS — R05.9 COUGH: ICD-10-CM

## 2021-09-29 DIAGNOSIS — R30.0 DYSURIA: ICD-10-CM

## 2021-09-29 LAB
BILIRUBIN, POC: NORMAL
BLOOD URINE, POC: NORMAL
CLARITY, POC: CLEAR
COLOR, POC: YELLOW
GLUCOSE URINE, POC: NORMAL
KETONES, POC: NORMAL
LEUKOCYTE EST, POC: NORMAL
NITRITE, POC: NORMAL
PH, POC: 7
PROTEIN, POC: NORMAL
SPECIFIC GRAVITY, POC: 1.01
UROBILINOGEN, POC: 0.2

## 2021-09-29 PROCEDURE — 99214 OFFICE O/P EST MOD 30 MIN: CPT | Performed by: FAMILY MEDICINE

## 2021-09-29 PROCEDURE — 81002 URINALYSIS NONAUTO W/O SCOPE: CPT | Performed by: FAMILY MEDICINE

## 2021-09-29 RX ORDER — ACYCLOVIR 800 MG/1
800 TABLET ORAL 4 TIMES DAILY
Qty: 40 TABLET | Refills: 0 | Status: SHIPPED | OUTPATIENT
Start: 2021-09-29 | End: 2021-10-09

## 2021-09-29 RX ORDER — FAMOTIDINE 20 MG/1
20 TABLET, FILM COATED ORAL 2 TIMES DAILY
COMMUNITY
End: 2022-06-28

## 2021-09-29 ASSESSMENT — ENCOUNTER SYMPTOMS
VOMITING: 0
COUGH: 1
TROUBLE SWALLOWING: 0
WHEEZING: 0
EYE PAIN: 0
NAUSEA: 1
SINUS PAIN: 0
CONSTIPATION: 0
DIARRHEA: 0
BACK PAIN: 0
SORE THROAT: 0
ABDOMINAL PAIN: 0
SHORTNESS OF BREATH: 1
CHEST TIGHTNESS: 1

## 2021-09-29 NOTE — PROGRESS NOTES
21    Name: Aamir Hanley  :1980   Sex:female   Age:41 y.o. Chief Complaint   Patient presents with    Cough    Shortness of Breath    Rash    Dysuria     Patient presents to office for visit. She started with covid symptoms on 9/10/21 and tested positive on 21. Patient had a fever up until yesterday. She is having a lot of pain in her left rib area which started . Patient says her urine is dark and cloudy. She says her lungs burn and she is cough constantly, especially at night. Patient feels very short of breath. Patient started with an itchy rash on her back recently as well. Patient here for recheck on covid s/s  [facility]  and tested and started on some medications  Then went to the ED on , had a cxr and then left the ED    The cxr then showed early pneumonia  She initially got worse the next few days  Tried to stay hydrated  And the last few days she has been  Moving around a lot more  But still getting short of breath  Feels like she broke a rib coughing  She has been having left flank pain since about the   She did also notice a rash back there but really did not think anything of it at the time  The pain has continued it hurts just to touch her skin back there and it hurts if she lays on her left side    No longer having fevers  No diarrhea  Feels very tired      Review of Systems   Constitutional: Positive for appetite change. Negative for fatigue and fever. HENT: Negative for congestion, ear pain, sinus pain, sore throat and trouble swallowing. Eyes: Negative for pain. Respiratory: Positive for cough, chest tightness and shortness of breath. Negative for wheezing. Cardiovascular: Negative for chest pain, palpitations and leg swelling. Gastrointestinal: Positive for nausea. Negative for abdominal pain, constipation, diarrhea and vomiting. Endocrine: Negative for cold intolerance and heat intolerance.    Genitourinary: Positive for dysuria. Negative for difficulty urinating, frequency, hematuria, pelvic pain and urgency. Musculoskeletal: Negative for back pain, gait problem and joint swelling. Skin: Positive for rash. Negative for wound. Neurological: Negative for dizziness, syncope, light-headedness and headaches. Hematological: Negative for adenopathy. Psychiatric/Behavioral: Negative for confusion, sleep disturbance and suicidal ideas. Current Outpatient Medications:     Dextromethorphan Polistirex (DELSYM PO), Take by mouth, Disp: , Rfl:     famotidine (PEPCID) 20 MG tablet, Take 20 mg by mouth 2 times daily, Disp: , Rfl:     acyclovir (ZOVIRAX) 800 MG tablet, Take 1 tablet by mouth 4 times daily for 10 days, Disp: 40 tablet, Rfl: 0    albuterol sulfate HFA (PROAIR HFA) 108 (90 Base) MCG/ACT inhaler, Inhale 2 puffs into the lungs every 4-6 hours as needed for Wheezing or Shortness of Breath, Disp: 18 g, Rfl: 0    Probiotic Product (PROBIOTIC PO), Take by mouth daily 1 by mouth everyday, Disp: , Rfl:     Vitamin D (CHOLECALCIFEROL) 25 MCG (1000 UT) TABS tablet, Take 1,000 Units by mouth daily 3 to 5 by mouth everyday, Disp: , Rfl:   No Known Allergies   Past Medical History:   Diagnosis Date    HPV (human papilloma virus) anogenital infection     Positive    Hypothyroidism     Incontinence     Urge    Inguinal hernia 03/2013    Bilateral Repair with Umbilical Hernia Repair      There are no problems to display for this patient.      Past Surgical History:   Procedure Laterality Date    LEEP  2004    Due to cervical cancer      Social History     Tobacco History     Smoking Status  Never Smoker    Smokeless Tobacco Use  Never Used          Alcohol History     Alcohol Use Status  Yes Comment  Occasionally 3 drinks per week          Drug Use     Drug Use Status  Never          Sexual Activity     Sexually Active  Not Asked            /80   Pulse 112   Temp 98.5 °F (36.9 °C)   Ht 5' 7\" (1.702 m)   Wt 137 lb 3.2 oz (62.2 kg)   SpO2 92%   BMI 21.49 kg/m²     EXAM:   Physical Exam  Vitals and nursing note reviewed. Constitutional:       General: She is not in acute distress. Appearance: She is well-developed. She is ill-appearing. HENT:      Head: Normocephalic and atraumatic. Right Ear: Tympanic membrane normal.      Left Ear: Tympanic membrane normal.      Nose: Congestion present. Mouth/Throat:      Mouth: Mucous membranes are moist.   Eyes:      Pupils: Pupils are equal, round, and reactive to light. Cardiovascular:      Rate and Rhythm: Normal rate and regular rhythm. Pulmonary:      Effort: Pulmonary effort is normal.      Comments: Crackles thru out  Abdominal:      General: Bowel sounds are normal.      Palpations: Abdomen is soft. Musculoskeletal:      Cervical back: Normal range of motion. Skin:     General: Skin is warm and dry. Findings: Rash present. Comments: Left back and flank there is rash, scabbed over at this point, left side is tender on exam, just touching the skin   Neurological:      Mental Status: She is alert and oriented to person, place, and time. Mental status is at baseline. Dudley Zazueta was seen today for cough, shortness of breath, rash and dysuria. Diagnoses and all orders for this visit:    Pneumonia due to COVID-19 virus  Comments:  she has finished antibitoics and steroids  push fluids and start to move more  f/u in a few days if her s/s worsen again    Dysuria  -     POCT Urinalysis no Micro    Cough  -     XR CHEST (2 VW); Future    Herpes zoster without complication  Comments:  left flank pain and rash is shingles  she doesnot want pain meds  will do acyclovir 800mg qid x 10 days  f/u as needed    Other orders  -     acyclovir (ZOVIRAX) 800 MG tablet;  Take 1 tablet by mouth 4 times daily for 10 days        I independently reviewed and updated the chief complaint, HPI, past medical and surgical history, medications, allergies and ROS as entered by the LPN. Seen by:   Che Andrade DO

## 2021-10-26 ENCOUNTER — OFFICE VISIT (OUTPATIENT)
Dept: FAMILY MEDICINE CLINIC | Age: 41
End: 2021-10-26
Payer: OTHER GOVERNMENT

## 2021-10-26 VITALS
HEIGHT: 67 IN | RESPIRATION RATE: 18 BRPM | SYSTOLIC BLOOD PRESSURE: 122 MMHG | WEIGHT: 137.4 LBS | BODY MASS INDEX: 21.56 KG/M2 | OXYGEN SATURATION: 98 % | DIASTOLIC BLOOD PRESSURE: 82 MMHG | HEART RATE: 102 BPM | TEMPERATURE: 96.8 F

## 2021-10-26 DIAGNOSIS — U07.1 PNEUMONIA DUE TO COVID-19 VIRUS: Primary | ICD-10-CM

## 2021-10-26 DIAGNOSIS — J12.82 PNEUMONIA DUE TO COVID-19 VIRUS: Primary | ICD-10-CM

## 2021-10-26 PROCEDURE — 99213 OFFICE O/P EST LOW 20 MIN: CPT | Performed by: FAMILY MEDICINE

## 2021-10-26 ASSESSMENT — ENCOUNTER SYMPTOMS
EYE PAIN: 0
ALLERGIC/IMMUNOLOGIC NEGATIVE: 1
SORE THROAT: 0
TROUBLE SWALLOWING: 0
SINUS PAIN: 0
PHOTOPHOBIA: 0
SHORTNESS OF BREATH: 0
ABDOMINAL PAIN: 0
CHEST TIGHTNESS: 0
DIARRHEA: 0
EYE REDNESS: 0
BLOOD IN STOOL: 0
COUGH: 1
NAUSEA: 0
VOMITING: 0
BACK PAIN: 0
EYE DISCHARGE: 0

## 2021-10-26 NOTE — PROGRESS NOTES
myalgias. Skin: Negative. Allergic/Immunologic: Negative. Neurological: Negative for dizziness, seizures, syncope, weakness, light-headedness, numbness and headaches. Hematological: Negative for adenopathy. Does not bruise/bleed easily. Psychiatric/Behavioral: Negative. Current Outpatient Medications:     Dextromethorphan Polistirex (DELSYM PO), Take by mouth, Disp: , Rfl:     famotidine (PEPCID) 20 MG tablet, Take 20 mg by mouth 2 times daily, Disp: , Rfl:     albuterol sulfate HFA (PROAIR HFA) 108 (90 Base) MCG/ACT inhaler, Inhale 2 puffs into the lungs every 4-6 hours as needed for Wheezing or Shortness of Breath, Disp: 18 g, Rfl: 0    Probiotic Product (PROBIOTIC PO), Take by mouth daily 1 by mouth everyday, Disp: , Rfl:     Vitamin D (CHOLECALCIFEROL) 25 MCG (1000 UT) TABS tablet, Take 1,000 Units by mouth daily 3 to 5 by mouth everyday, Disp: , Rfl:   No Known Allergies    Past Medical History:   Diagnosis Date    HPV (human papilloma virus) anogenital infection     Positive    Hypothyroidism     Incontinence     Urge    Inguinal hernia 03/2013    Bilateral Repair with Umbilical Hernia Repair      Past Surgical History:   Procedure Laterality Date    LEEP  2004    Due to cervical cancer     No family history on file.   Social History     Socioeconomic History    Marital status:      Spouse name: Not on file    Number of children: Not on file    Years of education: Not on file    Highest education level: Not on file   Occupational History    Not on file   Tobacco Use    Smoking status: Never Smoker    Smokeless tobacco: Never Used   Substance and Sexual Activity    Alcohol use: Yes     Comment: Occasionally 3 drinks per week    Drug use: Never    Sexual activity: Not on file   Other Topics Concern    Not on file   Social History Narrative    Not on file     Social Determinants of Health     Financial Resource Strain:     Difficulty of Paying Living Expenses: Food Insecurity:     Worried About Running Out of Food in the Last Year:     [address] in the Last Year:    Transportation Needs:     Lack of Transportation (Medical):  Lack of Transportation (Non-Medical):    Physical Activity:     Days of Exercise per Week:     Minutes of Exercise per Session:    Stress:     Feeling of Stress :    Social Connections:     Frequency of Communication with Friends and Family:     Frequency of Social Gatherings with Friends and Family:     Attends Faith Services:     Active Member of Clubs or Organizations:     Attends Club or Organization Meetings:     Marital Status:    Intimate Partner Violence:     Fear of Current or Ex-Partner:     Emotionally Abused:     Physically Abused:     Sexually Abused:        Vitals:    10/26/21 0858   BP: 122/82   Pulse: 102   Resp: 18   Temp: 96.8 °F (36 °C)   TempSrc: Temporal   SpO2: 98%   Weight: 137 lb 6.4 oz (62.3 kg)   Height: 5' 7\" (1.702 m)       Physical Exam  Vitals and nursing note reviewed. Constitutional:       Appearance: She is well-developed. HENT:      Head: Atraumatic. Right Ear: External ear normal.      Left Ear: External ear normal.      Nose: Nose normal.      Mouth/Throat:      Pharynx: No oropharyngeal exudate. Eyes:      Conjunctiva/sclera: Conjunctivae normal.      Pupils: Pupils are equal, round, and reactive to light. Neck:      Thyroid: No thyromegaly. Trachea: No tracheal deviation. Cardiovascular:      Rate and Rhythm: Normal rate and regular rhythm. Heart sounds: No murmur heard. No friction rub. No gallop. Pulmonary:      Effort: Pulmonary effort is normal. No respiratory distress. Breath sounds: Rhonchi present. Comments: Rhonchi heard at bases bilaterally clear with cough. There is point tenderness to palpation costochondral cartilages of the left upper chest wall.   Abdominal:      General: Bowel sounds are normal.      Palpations: Abdomen is soft.   Musculoskeletal:         General: No tenderness or deformity. Normal range of motion. Cervical back: Normal range of motion and neck supple. Lymphadenopathy:      Cervical: No cervical adenopathy. Skin:     General: Skin is warm and dry. Capillary Refill: Capillary refill takes less than 2 seconds. Findings: No rash. Neurological:      Mental Status: She is alert and oriented to person, place, and time. Sensory: No sensory deficit. Motor: No abnormal muscle tone.       Coordination: Coordination normal.      Deep Tendon Reflexes: Reflexes normal.             Seen By:  Sukhdeep Browning DO

## 2022-01-18 ENCOUNTER — OFFICE VISIT (OUTPATIENT)
Dept: PODIATRY | Age: 42
End: 2022-01-18
Payer: OTHER GOVERNMENT

## 2022-01-18 DIAGNOSIS — S99.912A INJURY OF LEFT ANKLE, INITIAL ENCOUNTER: ICD-10-CM

## 2022-01-18 DIAGNOSIS — M25.572 ACUTE LEFT ANKLE PAIN: Primary | ICD-10-CM

## 2022-01-18 PROCEDURE — 99213 OFFICE O/P EST LOW 20 MIN: CPT | Performed by: PODIATRIST

## 2022-01-18 NOTE — PROGRESS NOTES
Patient in office with c/o left ankle pain. Log hit get 2 weeks ago. Continues to have pain. Xrays obtained prior to apt. CC:    New injury left ankle    HPI:   Margret Mena presents today new injury left ankle. Previously did have left ankle pain following a previous injury but has been doing well. States the log hit the top of her ankle approximately 2 weeks ago. No open skin lesions or abrasions at time. Radiographs were obtained left ankle on 1/18/2022. Wearing regular shoes today. ROS:  Const: Denies constitutional symptoms  Musculo: Denies symptoms other than stated above  Skin: Denies symptoms other than stated above       Current Outpatient Medications:     Dextromethorphan Polistirex (DELSYM PO), Take by mouth, Disp: , Rfl:     famotidine (PEPCID) 20 MG tablet, Take 20 mg by mouth 2 times daily, Disp: , Rfl:     albuterol sulfate HFA (PROAIR HFA) 108 (90 Base) MCG/ACT inhaler, Inhale 2 puffs into the lungs every 4-6 hours as needed for Wheezing or Shortness of Breath, Disp: 18 g, Rfl: 0    Probiotic Product (PROBIOTIC PO), Take by mouth daily 1 by mouth everyday, Disp: , Rfl:     Vitamin D (CHOLECALCIFEROL) 25 MCG (1000 UT) TABS tablet, Take 1,000 Units by mouth daily 3 to 5 by mouth everyday, Disp: , Rfl:   No Known Allergies    Past Medical History:   Diagnosis Date    HPV (human papilloma virus) anogenital infection     Positive    Hypothyroidism     Incontinence     Urge    Inguinal hernia 03/2013    Bilateral Repair with Umbilical Hernia Repair            There were no vitals filed for this visit. Work History/Social History: [employer] [place],  [employer]. Focused Lower Extremity Physical Exam:    Neurovascular examination:    Dorsalis Pedis palpable bilateral.  Posterior tibialis palpable bilateral.    Capillary Refill Time:  Immediate return  Hair growth:  Symmetrical and bilateral   Skin:  Not atrophic  Edema: No edema bilateral feet or ankles.   Neurologic:  Light touch intact bilateral.       Musculoskeletal/ Orthopedic examination:    Equinis: Absent bilateral  Dorsiflexion, plantarflexion, inversion, eversion bilateral 5 out of 5 muscle strength  Wiggling toes  Negative Homans  There is mild tenderness overlying left ATFL. Negative anterior drawer. Negative Marcelo  No pain medial ankle. Dermatology examination:    No open skin lesions or abrasions. No erythema. Assessment and Plan:  Josefina Avitia was seen today for ankle pain. Diagnoses and all orders for this visit:    Acute left ankle pain  -     XR ANKLE LEFT (MIN 3 VIEWS); Future    Injury of left ankle, initial encounter      Left ankle pain  Recent injury left ankle. New radiographs from 1/18/2020 reviewed. No acute bony abnormality identified.  Stable when compared to previous. Ace bandage for edema control to be worn during the day remove at night any calf pain emergency room. We did discuss over-the-counter ankle supportive brace. If recurrent symptoms come in sooner. I will follow-up as needed. Return if symptoms worsen or fail to improve. Seen By:  Erica Valverde DPM      Document was created using voice recognition software. Note was reviewed, however may contain grammatical errors.

## 2022-06-23 ENCOUNTER — TELEPHONE (OUTPATIENT)
Dept: FAMILY MEDICINE CLINIC | Age: 42
End: 2022-06-23

## 2022-06-23 DIAGNOSIS — Z00.00 ENCOUNTER FOR WELL ADULT EXAM WITHOUT ABNORMAL FINDINGS: Primary | ICD-10-CM

## 2022-06-28 ENCOUNTER — OFFICE VISIT (OUTPATIENT)
Dept: FAMILY MEDICINE CLINIC | Age: 42
End: 2022-06-28
Payer: OTHER GOVERNMENT

## 2022-06-28 VITALS
SYSTOLIC BLOOD PRESSURE: 120 MMHG | TEMPERATURE: 98.1 F | BODY MASS INDEX: 22.32 KG/M2 | HEIGHT: 67 IN | HEART RATE: 64 BPM | DIASTOLIC BLOOD PRESSURE: 72 MMHG | OXYGEN SATURATION: 98 % | WEIGHT: 142.2 LBS

## 2022-06-28 DIAGNOSIS — J30.1 SEASONAL ALLERGIC RHINITIS DUE TO POLLEN: ICD-10-CM

## 2022-06-28 DIAGNOSIS — Z00.00 ENCOUNTER FOR WELL ADULT EXAM WITHOUT ABNORMAL FINDINGS: Primary | ICD-10-CM

## 2022-06-28 DIAGNOSIS — Z12.31 ENCOUNTER FOR SCREENING MAMMOGRAM FOR BREAST CANCER: ICD-10-CM

## 2022-06-28 PROCEDURE — 99396 PREV VISIT EST AGE 40-64: CPT | Performed by: FAMILY MEDICINE

## 2022-06-28 RX ORDER — FLUTICASONE PROPIONATE 50 MCG
2 SPRAY, SUSPENSION (ML) NASAL DAILY
Qty: 16 G | Refills: 5 | Status: SHIPPED
Start: 2022-06-28 | End: 2022-08-31

## 2022-06-28 ASSESSMENT — ENCOUNTER SYMPTOMS
SINUS PAIN: 0
ABDOMINAL PAIN: 0
COUGH: 0
SORE THROAT: 0
CONSTIPATION: 0
CHEST TIGHTNESS: 0
NAUSEA: 0
DIARRHEA: 0
BACK PAIN: 0
TROUBLE SWALLOWING: 0
SHORTNESS OF BREATH: 0
VOMITING: 0
EYE PAIN: 0
WHEEZING: 0

## 2022-06-28 ASSESSMENT — PATIENT HEALTH QUESTIONNAIRE - PHQ9
1. LITTLE INTEREST OR PLEASURE IN DOING THINGS: 0
SUM OF ALL RESPONSES TO PHQ QUESTIONS 1-9: 0
SUM OF ALL RESPONSES TO PHQ QUESTIONS 1-9: 0
SUM OF ALL RESPONSES TO PHQ9 QUESTIONS 1 & 2: 0
SUM OF ALL RESPONSES TO PHQ QUESTIONS 1-9: 0
SUM OF ALL RESPONSES TO PHQ QUESTIONS 1-9: 0
2. FEELING DOWN, DEPRESSED OR HOPELESS: 0

## 2022-06-28 NOTE — PROGRESS NOTES
22    Name: Sasha Tesfaye  :1980   Sex:female   Age:42 y.o. Chief Complaint   Patient presents with    Annual Exam     Patient presents to office for yearly check up. Patient denies any lingering issues from covid infection earlier in the year. She is doing weight lifting and bicycle riding without any issues. Patient says she is having possible post nasal drip right now. She has a very sore throat at night. Patient did start taking claritin recently to help and has only noticed a slight improvement. Here for yearly check up  Doing well    Allergy issues the last few days  Has been taking clritin but just started it  Having a lot of drainage  No headaches  No fevers  Suggested she also add flonase to help    mammogrma ordered, she would like to get it done    Labs tomorrow    Doing well other wise  No complaints    Review of Systems   Constitutional: Negative for appetite change, fatigue and fever. HENT: Negative for congestion, ear pain, sinus pain, sore throat and trouble swallowing. Eyes: Negative for pain. Respiratory: Negative for cough, chest tightness, shortness of breath and wheezing. Cardiovascular: Negative for chest pain, palpitations and leg swelling. Gastrointestinal: Negative for abdominal pain, constipation, diarrhea, nausea and vomiting. Endocrine: Negative for cold intolerance and heat intolerance. Genitourinary: Negative for difficulty urinating, hematuria and pelvic pain. Musculoskeletal: Negative for back pain, gait problem and joint swelling. Skin: Negative for rash and wound. Neurological: Negative for dizziness, syncope and headaches. Hematological: Negative for adenopathy. Psychiatric/Behavioral: Negative for confusion, sleep disturbance and suicidal ideas.            Current Outpatient Medications:     Loratadine (CLARITIN PO), Take by mouth, Disp: , Rfl:     Ascorbic Acid (VITAMIN C PO), Take by mouth, Disp: , Rfl:     Vitamin D-Vitamin K (VITAMIN K2-VITAMIN D3 PO), Take by mouth, Disp: , Rfl:     MAGNESIUM PO, Take by mouth, Disp: , Rfl:     fluticasone (FLONASE) 50 MCG/ACT nasal spray, 2 sprays by Each Nostril route daily, Disp: 16 g, Rfl: 5    Probiotic Product (PROBIOTIC PO), Take by mouth daily 1 by mouth everyday, Disp: , Rfl:   No Known Allergies   Past Medical History:   Diagnosis Date    HPV (human papilloma virus) anogenital infection     Positive    Hypothyroidism     Incontinence     Urge    Inguinal hernia 03/2013    Bilateral Repair with Umbilical Hernia Repair      There are no problems to display for this patient. Past Surgical History:   Procedure Laterality Date    LEEP  2004    Due to cervical cancer      Social History     Tobacco History     Smoking Status  Never Smoker    Smokeless Tobacco Use  Never Used          Alcohol History     Alcohol Use Status  Yes Comment  Occasionally 3 drinks per week          Drug Use     Drug Use Status  Never          Sexual Activity     Sexually Active  Not Asked            /72   Pulse 64   Temp 98.1 °F (36.7 °C)   Ht 5' 7\" (1.702 m)   Wt 142 lb 3.2 oz (64.5 kg)   SpO2 98%   BMI 22.27 kg/m²     EXAM:   Physical Exam  Vitals and nursing note reviewed. Constitutional:       General: She is not in acute distress. Appearance: She is well-developed. She is not ill-appearing. HENT:      Head: Normocephalic and atraumatic. Right Ear: Tympanic membrane normal.      Left Ear: Tympanic membrane normal.      Nose: Nose normal.      Mouth/Throat:      Mouth: Mucous membranes are moist.   Eyes:      Pupils: Pupils are equal, round, and reactive to light. Cardiovascular:      Rate and Rhythm: Normal rate and regular rhythm. Pulmonary:      Effort: Pulmonary effort is normal.      Breath sounds: Normal breath sounds. Abdominal:      General: Bowel sounds are normal.      Palpations: Abdomen is soft. Musculoskeletal:      Cervical back: Normal range of motion. Comments: Gait steady   Skin:     General: Skin is warm and dry. Neurological:      Mental Status: She is alert and oriented to person, place, and time. Mental status is at baseline. Psychiatric:         Mood and Affect: Mood normal.         Thought Content: Thought content normal.          Ana Rivera was seen today for annual exam.    Diagnoses and all orders for this visit:    Encounter for well adult exam without abnormal findings  Comments:  labs tomorrow  mammogrm today    Encounter for screening mammogram for breast cancer  -     RAFAELA GABY DIGITAL SCREEN BILATERAL PER PROTOCOL; Future    Seasonal allergic rhinitis due to pollen  Comments:  continue claritin  add flonase  f/u in a few weeks if not getting better    Other orders  -     fluticasone (FLONASE) 50 MCG/ACT nasal spray; 2 sprays by Each Nostril route daily        I independently reviewed and updated the chief complaint, HPI, past medical and surgical history, medications, allergies and ROS as entered by the LPN. Seen by:   Jayda Vernon DO

## 2022-06-29 DIAGNOSIS — Z00.00 ENCOUNTER FOR WELL ADULT EXAM WITHOUT ABNORMAL FINDINGS: ICD-10-CM

## 2022-06-29 LAB
ALBUMIN SERPL-MCNC: 4.8 G/DL (ref 3.5–5.2)
ALP BLD-CCNC: 44 U/L (ref 35–104)
ALT SERPL-CCNC: 8 U/L (ref 0–32)
ANION GAP SERPL CALCULATED.3IONS-SCNC: 17 MMOL/L (ref 7–16)
AST SERPL-CCNC: 15 U/L (ref 0–31)
BASOPHILS ABSOLUTE: 0.08 E9/L (ref 0–0.2)
BASOPHILS RELATIVE PERCENT: 2.1 % (ref 0–2)
BILIRUB SERPL-MCNC: 0.6 MG/DL (ref 0–1.2)
BUN BLDV-MCNC: 12 MG/DL (ref 6–20)
CALCIUM SERPL-MCNC: 9.2 MG/DL (ref 8.6–10.2)
CHLORIDE BLD-SCNC: 106 MMOL/L (ref 98–107)
CHOLESTEROL, TOTAL: 170 MG/DL (ref 0–199)
CO2: 20 MMOL/L (ref 22–29)
CREAT SERPL-MCNC: 0.9 MG/DL (ref 0.5–1)
EOSINOPHILS ABSOLUTE: 0.33 E9/L (ref 0.05–0.5)
EOSINOPHILS RELATIVE PERCENT: 8.6 % (ref 0–6)
GFR AFRICAN AMERICAN: >60
GFR NON-AFRICAN AMERICAN: >60 ML/MIN/1.73
GLUCOSE BLD-MCNC: 98 MG/DL (ref 74–99)
HCT VFR BLD CALC: 44.3 % (ref 34–48)
HDLC SERPL-MCNC: 47 MG/DL
HEMOGLOBIN: 14.4 G/DL (ref 11.5–15.5)
IMMATURE GRANULOCYTES #: 0.01 E9/L
IMMATURE GRANULOCYTES %: 0.3 % (ref 0–5)
LDL CHOLESTEROL CALCULATED: 110 MG/DL (ref 0–99)
LYMPHOCYTES ABSOLUTE: 1.33 E9/L (ref 1.5–4)
LYMPHOCYTES RELATIVE PERCENT: 34.5 % (ref 20–42)
MCH RBC QN AUTO: 30.3 PG (ref 26–35)
MCHC RBC AUTO-ENTMCNC: 32.5 % (ref 32–34.5)
MCV RBC AUTO: 93.3 FL (ref 80–99.9)
MONOCYTES ABSOLUTE: 0.38 E9/L (ref 0.1–0.95)
MONOCYTES RELATIVE PERCENT: 9.9 % (ref 2–12)
NEUTROPHILS ABSOLUTE: 1.72 E9/L (ref 1.8–7.3)
NEUTROPHILS RELATIVE PERCENT: 44.6 % (ref 43–80)
PDW BLD-RTO: 12.8 FL (ref 11.5–15)
PLATELET # BLD: 221 E9/L (ref 130–450)
PMV BLD AUTO: 10.5 FL (ref 7–12)
POTASSIUM SERPL-SCNC: 4.9 MMOL/L (ref 3.5–5)
RBC # BLD: 4.75 E12/L (ref 3.5–5.5)
SODIUM BLD-SCNC: 143 MMOL/L (ref 132–146)
TOTAL PROTEIN: 7.3 G/DL (ref 6.4–8.3)
TRIGL SERPL-MCNC: 66 MG/DL (ref 0–149)
TSH SERPL DL<=0.05 MIU/L-ACNC: 0.59 UIU/ML (ref 0.27–4.2)
VLDLC SERPL CALC-MCNC: 13 MG/DL
WBC # BLD: 3.9 E9/L (ref 4.5–11.5)

## 2022-07-11 ENCOUNTER — HOSPITAL ENCOUNTER (INPATIENT)
Age: 42
LOS: 7 days | Discharge: HOME HEALTH | DRG: 493 | End: 2022-07-18
Attending: EMERGENCY MEDICINE | Admitting: SURGERY
Payer: OTHER GOVERNMENT

## 2022-07-11 ENCOUNTER — APPOINTMENT (OUTPATIENT)
Dept: GENERAL RADIOLOGY | Age: 42
DRG: 493 | End: 2022-07-11
Payer: OTHER GOVERNMENT

## 2022-07-11 ENCOUNTER — APPOINTMENT (OUTPATIENT)
Dept: CT IMAGING | Age: 42
DRG: 493 | End: 2022-07-11
Payer: OTHER GOVERNMENT
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
## 2022-07-11 DIAGNOSIS — E87.20 LACTIC ACIDOSIS: ICD-10-CM

## 2022-07-11 DIAGNOSIS — S32.592A CLOSED FRACTURE OF LEFT INFERIOR PUBIC RAMUS, INITIAL ENCOUNTER (HCC): ICD-10-CM

## 2022-07-11 DIAGNOSIS — S32.10XA CLOSED FRACTURE OF SACRUM, UNSPECIFIED PORTION OF SACRUM, INITIAL ENCOUNTER (HCC): ICD-10-CM

## 2022-07-11 DIAGNOSIS — T14.90XA TRAUMA: ICD-10-CM

## 2022-07-11 DIAGNOSIS — S42.452A: ICD-10-CM

## 2022-07-11 DIAGNOSIS — S32.512A CLOSED FRACTURE OF SUPERIOR RAMUS OF LEFT PUBIS, INITIAL ENCOUNTER (HCC): ICD-10-CM

## 2022-07-11 LAB
ABO/RH: NORMAL
ACETAMINOPHEN LEVEL: <5 MCG/ML (ref 10–30)
ALBUMIN SERPL-MCNC: 4.3 G/DL (ref 3.5–5.2)
ALP BLD-CCNC: 48 U/L (ref 35–104)
ALT SERPL-CCNC: 11 U/L (ref 0–32)
AMPHETAMINE SCREEN, URINE: NOT DETECTED
ANGLE (CLOT STRENGTH): 74.6 DEGREE (ref 59–74)
ANION GAP SERPL CALCULATED.3IONS-SCNC: 17 MMOL/L (ref 7–16)
ANTIBODY SCREEN: NORMAL
APTT: 27.3 SEC (ref 24.5–35.1)
AST SERPL-CCNC: 20 U/L (ref 0–31)
B.E.: -2.5 MMOL/L (ref -3–3)
BARBITURATE SCREEN URINE: NOT DETECTED
BENZODIAZEPINE SCREEN, URINE: NOT DETECTED
BILIRUB SERPL-MCNC: 0.4 MG/DL (ref 0–1.2)
BUN BLDV-MCNC: 18 MG/DL (ref 6–20)
CALCIUM SERPL-MCNC: 8.6 MG/DL (ref 8.6–10.2)
CANNABINOID SCREEN URINE: NOT DETECTED
CHLORIDE BLD-SCNC: 108 MMOL/L (ref 98–107)
CO2: 17 MMOL/L (ref 22–29)
COCAINE METABOLITE SCREEN URINE: NOT DETECTED
COHB: 0.3 % (ref 0–1.5)
CREAT SERPL-MCNC: 0.9 MG/DL (ref 0.5–1)
CRITICAL: ABNORMAL
DATE ANALYZED: ABNORMAL
DATE OF COLLECTION: ABNORMAL
EPL-TEG: 0.2 % (ref 0–15)
ETHANOL: <10 MG/DL (ref 0–0.08)
FENTANYL SCREEN, URINE: POSITIVE
G-TEG: 10.3 K D/SC (ref 4.5–11)
GFR AFRICAN AMERICAN: >60
GFR NON-AFRICAN AMERICAN: >60 ML/MIN/1.73
GLUCOSE BLD-MCNC: 155 MG/DL (ref 74–99)
HCG(URINE) PREGNANCY TEST: NEGATIVE
HCO3: 18.5 MMOL/L (ref 22–26)
HCT VFR BLD CALC: 40.2 % (ref 34–48)
HEMOGLOBIN: 13.6 G/DL (ref 11.5–15.5)
HHB: 0.9 % (ref 0–5)
INR BLD: 1.2
K (CLOTTING TIME): 1.1 MIN (ref 1–3)
LAB: ABNORMAL
LACTIC ACID: 3.8 MMOL/L (ref 0.5–2.2)
LY30 (FIBRINOLYSIS): 0.2 % (ref 0–8)
Lab: ABNORMAL
Lab: ABNORMAL
MA (MAX AMPLITUDE): 67.2 MM (ref 50–70)
MCH RBC QN AUTO: 30.8 PG (ref 26–35)
MCHC RBC AUTO-ENTMCNC: 33.8 % (ref 32–34.5)
MCV RBC AUTO: 91 FL (ref 80–99.9)
METHADONE SCREEN, URINE: NOT DETECTED
METHB: 0.2 % (ref 0–1.5)
MODE: ABNORMAL
O2 CONTENT: 18.7 ML/DL
O2 SATURATION: 99.1 % (ref 92–98.5)
O2HB: 98.6 % (ref 94–97)
OPERATOR ID: 421
OPIATE SCREEN URINE: NOT DETECTED
OXYCODONE URINE: NOT DETECTED
PATIENT TEMP: 37 C
PCO2: 23.2 MMHG (ref 35–45)
PDW BLD-RTO: 13 FL (ref 11.5–15)
PH BLOOD GAS: 7.52 (ref 7.35–7.45)
PHENCYCLIDINE SCREEN URINE: NOT DETECTED
PLATELET # BLD: 244 E9/L (ref 130–450)
PMV BLD AUTO: 9.8 FL (ref 7–12)
PO2: 196.4 MMHG (ref 75–100)
POTASSIUM SERPL-SCNC: 3.24 MMOL/L (ref 3.5–5)
POTASSIUM SERPL-SCNC: 3.6 MMOL/L (ref 3.5–5)
PROTHROMBIN TIME: 12.9 SEC (ref 9.3–12.4)
R (REACTION TIME): 3.1 MIN (ref 5–10)
RBC # BLD: 4.42 E12/L (ref 3.5–5.5)
SALICYLATE, SERUM: <0.3 MG/DL (ref 0–30)
SODIUM BLD-SCNC: 142 MMOL/L (ref 132–146)
SOURCE, BLOOD GAS: ABNORMAL
THB: 13.2 G/DL (ref 11.5–16.5)
TIME ANALYZED: 1420
TOTAL PROTEIN: 6.8 G/DL (ref 6.4–8.3)
TRICYCLIC ANTIDEPRESSANTS SCREEN SERUM: NEGATIVE NG/ML
WBC # BLD: 10.6 E9/L (ref 4.5–11.5)

## 2022-07-11 PROCEDURE — 81025 URINE PREGNANCY TEST: CPT

## 2022-07-11 PROCEDURE — 86900 BLOOD TYPING SEROLOGIC ABO: CPT

## 2022-07-11 PROCEDURE — 71260 CT THORAX DX C+: CPT

## 2022-07-11 PROCEDURE — 85610 PROTHROMBIN TIME: CPT

## 2022-07-11 PROCEDURE — 73070 X-RAY EXAM OF ELBOW: CPT

## 2022-07-11 PROCEDURE — 85730 THROMBOPLASTIN TIME PARTIAL: CPT

## 2022-07-11 PROCEDURE — 73501 X-RAY EXAM HIP UNI 1 VIEW: CPT

## 2022-07-11 PROCEDURE — 71045 X-RAY EXAM CHEST 1 VIEW: CPT

## 2022-07-11 PROCEDURE — 80143 DRUG ASSAY ACETAMINOPHEN: CPT

## 2022-07-11 PROCEDURE — 80053 COMPREHEN METABOLIC PANEL: CPT

## 2022-07-11 PROCEDURE — 73080 X-RAY EXAM OF ELBOW: CPT

## 2022-07-11 PROCEDURE — 2500000003 HC RX 250 WO HCPCS

## 2022-07-11 PROCEDURE — 82077 ASSAY SPEC XCP UR&BREATH IA: CPT

## 2022-07-11 PROCEDURE — 86850 RBC ANTIBODY SCREEN: CPT

## 2022-07-11 PROCEDURE — 76376 3D RENDER W/INTRP POSTPROCES: CPT

## 2022-07-11 PROCEDURE — 80179 DRUG ASSAY SALICYLATE: CPT

## 2022-07-11 PROCEDURE — 72170 X-RAY EXAM OF PELVIS: CPT

## 2022-07-11 PROCEDURE — 74177 CT ABD & PELVIS W/CONTRAST: CPT

## 2022-07-11 PROCEDURE — 84132 ASSAY OF SERUM POTASSIUM: CPT

## 2022-07-11 PROCEDURE — 99152 MOD SED SAME PHYS/QHP 5/>YRS: CPT

## 2022-07-11 PROCEDURE — 6360000002 HC RX W HCPCS

## 2022-07-11 PROCEDURE — 1200000000 HC SEMI PRIVATE

## 2022-07-11 PROCEDURE — 85384 FIBRINOGEN ACTIVITY: CPT

## 2022-07-11 PROCEDURE — 6370000000 HC RX 637 (ALT 250 FOR IP)

## 2022-07-11 PROCEDURE — 36415 COLL VENOUS BLD VENIPUNCTURE: CPT

## 2022-07-11 PROCEDURE — 70450 CT HEAD/BRAIN W/O DYE: CPT

## 2022-07-11 PROCEDURE — 73030 X-RAY EXAM OF SHOULDER: CPT

## 2022-07-11 PROCEDURE — 85347 COAGULATION TIME ACTIVATED: CPT

## 2022-07-11 PROCEDURE — 86901 BLOOD TYPING SEROLOGIC RH(D): CPT

## 2022-07-11 PROCEDURE — 85576 BLOOD PLATELET AGGREGATION: CPT

## 2022-07-11 PROCEDURE — 82805 BLOOD GASES W/O2 SATURATION: CPT

## 2022-07-11 PROCEDURE — 80307 DRUG TEST PRSMV CHEM ANLYZR: CPT

## 2022-07-11 PROCEDURE — 6360000004 HC RX CONTRAST MEDICATION: Performed by: STUDENT IN AN ORGANIZED HEALTH CARE EDUCATION/TRAINING PROGRAM

## 2022-07-11 PROCEDURE — 85027 COMPLETE CBC AUTOMATED: CPT

## 2022-07-11 PROCEDURE — 6810039000 HC L1 TRAUMA ALERT

## 2022-07-11 PROCEDURE — 83605 ASSAY OF LACTIC ACID: CPT

## 2022-07-11 PROCEDURE — 72125 CT NECK SPINE W/O DYE: CPT

## 2022-07-11 PROCEDURE — 99223 1ST HOSP IP/OBS HIGH 75: CPT | Performed by: SURGERY

## 2022-07-11 PROCEDURE — 72128 CT CHEST SPINE W/O DYE: CPT

## 2022-07-11 PROCEDURE — 72131 CT LUMBAR SPINE W/O DYE: CPT

## 2022-07-11 PROCEDURE — 99285 EMERGENCY DEPT VISIT HI MDM: CPT

## 2022-07-11 PROCEDURE — 2580000003 HC RX 258

## 2022-07-11 PROCEDURE — 73200 CT UPPER EXTREMITY W/O DYE: CPT | Performed by: RADIOLOGY

## 2022-07-11 RX ORDER — SODIUM CHLORIDE 0.9 % (FLUSH) 0.9 %
10 SYRINGE (ML) INJECTION EVERY 12 HOURS SCHEDULED
Status: DISCONTINUED | OUTPATIENT
Start: 2022-07-11 | End: 2022-07-18 | Stop reason: HOSPADM

## 2022-07-11 RX ORDER — KETAMINE HCL IN NACL, ISO-OSM 100MG/10ML
80 SYRINGE (ML) INJECTION ONCE
Status: COMPLETED | OUTPATIENT
Start: 2022-07-11 | End: 2022-07-11

## 2022-07-11 RX ORDER — SODIUM CHLORIDE 9 MG/ML
INJECTION, SOLUTION INTRAVENOUS PRN
Status: DISCONTINUED | OUTPATIENT
Start: 2022-07-11 | End: 2022-07-18 | Stop reason: HOSPADM

## 2022-07-11 RX ORDER — OXYCODONE HYDROCHLORIDE 5 MG/1
5 TABLET ORAL EVERY 4 HOURS PRN
Status: DISCONTINUED | OUTPATIENT
Start: 2022-07-11 | End: 2022-07-18 | Stop reason: HOSPADM

## 2022-07-11 RX ORDER — ONDANSETRON 4 MG/1
4 TABLET, ORALLY DISINTEGRATING ORAL EVERY 8 HOURS PRN
Status: DISCONTINUED | OUTPATIENT
Start: 2022-07-11 | End: 2022-07-18 | Stop reason: HOSPADM

## 2022-07-11 RX ORDER — OXYCODONE HYDROCHLORIDE 10 MG/1
10 TABLET ORAL EVERY 4 HOURS PRN
Status: DISCONTINUED | OUTPATIENT
Start: 2022-07-11 | End: 2022-07-18 | Stop reason: HOSPADM

## 2022-07-11 RX ORDER — KETAMINE HCL IN NACL, ISO-OSM 100MG/10ML
1 SYRINGE (ML) INJECTION ONCE
Status: DISCONTINUED | OUTPATIENT
Start: 2022-07-11 | End: 2022-07-11

## 2022-07-11 RX ORDER — POLYETHYLENE GLYCOL 3350 17 G/17G
17 POWDER, FOR SOLUTION ORAL DAILY
Status: DISCONTINUED | OUTPATIENT
Start: 2022-07-11 | End: 2022-07-18 | Stop reason: HOSPADM

## 2022-07-11 RX ORDER — ONDANSETRON 2 MG/ML
4 INJECTION INTRAMUSCULAR; INTRAVENOUS EVERY 6 HOURS PRN
Status: DISCONTINUED | OUTPATIENT
Start: 2022-07-11 | End: 2022-07-18 | Stop reason: HOSPADM

## 2022-07-11 RX ORDER — ACETAMINOPHEN 325 MG/1
650 TABLET ORAL EVERY 6 HOURS
Status: DISCONTINUED | OUTPATIENT
Start: 2022-07-11 | End: 2022-07-18 | Stop reason: HOSPADM

## 2022-07-11 RX ORDER — KETAMINE HYDROCHLORIDE 10 MG/ML
1 INJECTION, SOLUTION INTRAMUSCULAR; INTRAVENOUS ONCE
Status: DISCONTINUED | OUTPATIENT
Start: 2022-07-11 | End: 2022-07-11

## 2022-07-11 RX ORDER — KETAMINE HCL IN NACL, ISO-OSM 100MG/10ML
SYRINGE (ML) INJECTION
Status: COMPLETED
Start: 2022-07-11 | End: 2022-07-11

## 2022-07-11 RX ORDER — SODIUM CHLORIDE 0.9 % (FLUSH) 0.9 %
10 SYRINGE (ML) INJECTION PRN
Status: DISCONTINUED | OUTPATIENT
Start: 2022-07-11 | End: 2022-07-18 | Stop reason: HOSPADM

## 2022-07-11 RX ORDER — SENNA AND DOCUSATE SODIUM 50; 8.6 MG/1; MG/1
1 TABLET, FILM COATED ORAL 2 TIMES DAILY
Status: DISCONTINUED | OUTPATIENT
Start: 2022-07-11 | End: 2022-07-18 | Stop reason: HOSPADM

## 2022-07-11 RX ORDER — SODIUM CHLORIDE, SODIUM LACTATE, POTASSIUM CHLORIDE, CALCIUM CHLORIDE 600; 310; 30; 20 MG/100ML; MG/100ML; MG/100ML; MG/100ML
INJECTION, SOLUTION INTRAVENOUS CONTINUOUS
Status: DISCONTINUED | OUTPATIENT
Start: 2022-07-11 | End: 2022-07-14

## 2022-07-11 RX ADMIN — ACETAMINOPHEN 325MG 650 MG: 325 TABLET ORAL at 16:24

## 2022-07-11 RX ADMIN — OXYCODONE HYDROCHLORIDE 10 MG: 10 TABLET ORAL at 19:51

## 2022-07-11 RX ADMIN — SODIUM CHLORIDE, POTASSIUM CHLORIDE, SODIUM LACTATE AND CALCIUM CHLORIDE: 600; 310; 30; 20 INJECTION, SOLUTION INTRAVENOUS at 16:23

## 2022-07-11 RX ADMIN — HYDROMORPHONE HYDROCHLORIDE 0.5 MG: 1 INJECTION, SOLUTION INTRAMUSCULAR; INTRAVENOUS; SUBCUTANEOUS at 15:39

## 2022-07-11 RX ADMIN — Medication 80 MG: at 18:03

## 2022-07-11 RX ADMIN — OXYCODONE HYDROCHLORIDE 10 MG: 10 TABLET ORAL at 23:20

## 2022-07-11 RX ADMIN — HYDROMORPHONE HYDROCHLORIDE 0.5 MG: 1 INJECTION, SOLUTION INTRAMUSCULAR; INTRAVENOUS; SUBCUTANEOUS at 21:22

## 2022-07-11 RX ADMIN — SODIUM CHLORIDE, PRESERVATIVE FREE 10 ML: 5 INJECTION INTRAVENOUS at 19:56

## 2022-07-11 RX ADMIN — IOPAMIDOL 90 ML: 755 INJECTION, SOLUTION INTRAVENOUS at 14:47

## 2022-07-11 RX ADMIN — ACETAMINOPHEN 325MG 650 MG: 325 TABLET ORAL at 19:54

## 2022-07-11 RX ADMIN — HYDROMORPHONE HYDROCHLORIDE 0.5 MG: 1 INJECTION, SOLUTION INTRAMUSCULAR; INTRAVENOUS; SUBCUTANEOUS at 18:34

## 2022-07-11 RX ADMIN — SENNOSIDES AND DOCUSATE SODIUM 1 TABLET: 50; 8.6 TABLET ORAL at 19:53

## 2022-07-11 ASSESSMENT — PAIN DESCRIPTION - LOCATION
LOCATION: HIP
LOCATION: HIP
LOCATION: HIP;LEG
LOCATION: HIP;ELBOW
LOCATION: HIP;ELBOW
LOCATION: HIP;KNEE;LEG

## 2022-07-11 ASSESSMENT — PAIN DESCRIPTION - DESCRIPTORS
DESCRIPTORS: ACHING;BURNING
DESCRIPTORS: ACHING;BURNING
DESCRIPTORS: THROBBING;STABBING;SHOOTING
DESCRIPTORS: ACHING;BURNING;CRAMPING;CRUSHING
DESCRIPTORS: THROBBING;STABBING;SHOOTING

## 2022-07-11 ASSESSMENT — PAIN DESCRIPTION - ORIENTATION
ORIENTATION: LEFT

## 2022-07-11 ASSESSMENT — PAIN DESCRIPTION - ONSET
ONSET: ON-GOING
ONSET: ON-GOING

## 2022-07-11 ASSESSMENT — PAIN DESCRIPTION - FREQUENCY
FREQUENCY: CONTINUOUS
FREQUENCY: CONTINUOUS

## 2022-07-11 ASSESSMENT — PAIN SCALES - GENERAL
PAINLEVEL_OUTOF10: 10
PAINLEVEL_OUTOF10: 10
PAINLEVEL_OUTOF10: 8
PAINLEVEL_OUTOF10: 8
PAINLEVEL_OUTOF10: 10
PAINLEVEL_OUTOF10: 8
PAINLEVEL_OUTOF10: 9
PAINLEVEL_OUTOF10: 10
PAINLEVEL_OUTOF10: 8

## 2022-07-11 ASSESSMENT — PAIN - FUNCTIONAL ASSESSMENT
PAIN_FUNCTIONAL_ASSESSMENT: PREVENTS OR INTERFERES WITH ALL ACTIVE AND SOME PASSIVE ACTIVITIES
PAIN_FUNCTIONAL_ASSESSMENT: INTOLERABLE, UNABLE TO DO ANY ACTIVE OR PASSIVE ACTIVITIES

## 2022-07-11 ASSESSMENT — PAIN DESCRIPTION - PAIN TYPE
TYPE: ACUTE PAIN
TYPE: ACUTE PAIN

## 2022-07-11 NOTE — CONSULTS
Oral, Q6H  oxyCODONE (ROXICODONE) immediate release tablet 5 mg, 5 mg, Oral, Q4H PRN **OR** oxyCODONE HCl (OXY-IR) immediate release tablet 10 mg, 10 mg, Oral, Q4H PRN  HYDROmorphone (DILAUDID) injection 0.5 mg, 0.5 mg, IntraVENous, Q3H PRN  sennosides-docusate sodium (SENOKOT-S) 8.6-50 MG tablet 1 tablet, 1 tablet, Oral, BID  bisacodyl (DULCOLAX) EC tablet 5 mg, 5 mg, Oral, Daily PRN  Allergies:  Patient has no allergy information on record. Social History:   TOBACCO:   has no history on file for tobacco use. ETOH:   has no history on file for alcohol use. DRUGS:   has no history on file for drug use. ACTIVITIES OF DAILY LIVING:    OCCUPATION:    Family History:   No family history on file. REVIEW OF SYSTEMS:  CONSTITUTIONAL:  negative for  fevers, chills  EYES:  negative for acute vision changes  HEENT:  negative for cough, hearing loss  RESPIRATORY:  negative for  dyspnea, wheezing  CARDIOVASCULAR:  negative for  chest pain  GASTROINTESTINAL:  negative for nausea, vomiting  GENITOURINARY:  negative for frequency, urinary incontinence  HEMATOLOGIC/LYMPHATIC:  negative for bleeding  MUSCULOSKELETAL:  positive for left hip and left elbow pain  NEUROLOGICAL:  negative for headaches, dizziness  BEHAVIOR/PSYCH:  negative for increased agitation and anxiety    PHYSICAL EXAM:    VITALS:  BP (!) 119/58   Pulse 78   Resp 20   Ht 5' (1.524 m)   Wt 140 lb (63.5 kg)   SpO2 100%   BMI 27.34 kg/m²   CONSTITUTIONAL:  awake, alert, cooperative, obvious pain, and appears stated age  MUSCULOSKELETAL:  Left upper Extremity:  · Skin examination reveals no superficial lacerations or abrasions. There is mild soft tissue edema about the lateral aspect of the elbow. Small joint effusion noted. · Tender to palpation over the radiocapitellar joint. No tenderness palpation of the olecranon, medial epicondyle. Remainder the left upper extremity. · Pain with passive flexion extension of the elbow.   Pain with passive demonstrate displaced superior and inferior pubic rami fractures. Suspected left-sided sacral ala fracture. Bilateral hips are located. Pubic symphysis is congruent midline. No beau SI joint widening noted on the left. 2 views left elbow reviewed demonstrating coronal shear fracture of the capitellum, displaced approximately 90 degrees of coronal rotation, type IV. The elbow joint is located. No other fractures dislocations noted. 2 views of the left shoulder reviewed demonstrating no acute fractures dislocations. Glenohumeral joint is well located. No other bony soft tissue normalities noted. CT abdomen pelvis reviewed demonstrating LC 1 pelvic ring injury. Comminuted sacral ala fracture. With displaced superior and inferior pubic rami fractures. Hip is well located. No other acute fracture dislocations noted. Procedure:  After written consent was obtained, the patient was sedated by the emergency department staff. Next the elbow was taken to full extension, anterior to posterior force was placed on the capitellum, the elbow was then taken from extension to flexion with distraction through the joint. Multiple attempts were made but the capitellum could not be located. The elbow was splinted in a long arm posterior slab splint. Patient tolerated the procedure well with no changes in NV status. IMPRESSION:  · Closed left pelvic ring injury. LC-I  · Closed left capitellum shear fracture, type - IV    PLAN:  · NWB LUE and LLE, well-padded posterior long-arm splint placed  · Pain control per trauma  · Antibiotics preoperatively for surgical prophylaxis  · N.p.o. at midnight  · Hold anticoagulants  · Plan for open duction internal fixation of left capitellum fracture. · Will attempt non-operative of the pelvis at this time  · Preoperative labs and imaging  · Appreciate trauma input  · All patient/family questions have been answered and patient is currently agreeable to plan.   · Discussed with Attending      Electronically signed by Cyndi Light DO on 7/11/2022 at 3:46 PM
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
## 2022-07-11 NOTE — ED NOTES
Left hip reduced xr at bedside for pelvic scan      Teofilo La RN  07/11/22 189 Ashleigh Mueller, RN  07/11/22 [id number]

## 2022-07-11 NOTE — ED NOTES
Conscious Sedation Procedure Note    Indication: R humerus fracture dislocation/ splint placement by orthopedics    Consent: I have discussed with the patient and/or the patient representative the indication, alternatives, and the possible risks and/or complications of the planned procedure and the anesthesia methods. The patient and/or patient representative appear to understand and agree to proceed. Physician Involvement: The attending physician was present and supervising this procedure. Pre-Sedation Documentation and Exam:  Time: see RN flowsheet  Lungs: clear to auscultation bilaterally, Cardiovascular: regular rate and rhythm. Airway Assessment: Mallampati Class I - (soft palate, fauces, uvula & anterior/posterior tonsillar pillars are visible)    Prior History of Anesthesia Complications: none    ASA Classification: Class 1 - A normal healthy patient    Sedation/ Anesthesia Plan: intravenous sedation    Medications Used: ketamine 80 mg intravenously    Monitoring and Safety: The patient was placed on a cardiac monitor and vital signs, pulse oximetry and level of consciousness were continuously evaluated throughout the procedure. The patient was closely monitored until recovery from the medications was complete and the patient had returned to baseline status. Respiratory therapy was on standby at all times during the procedure.     (The following sections must be completed)  Post-Sedation Vital Signs: Vital signs were reviewed and were stable after the procedure (see flow sheet for vitals)            Post-Sedation Exam:   Time: 6:25 PM.   Lungs: clear to auscultation bilaterally and Cardiovascular: mild tachycardia, regular rhythm           Complications: none         [address] Polanco DO  Resident  07/11/22 6392

## 2022-07-12 ENCOUNTER — ANESTHESIA EVENT (OUTPATIENT)
Dept: OPERATING ROOM | Age: 42
DRG: 493 | End: 2022-07-12
Payer: OTHER GOVERNMENT

## 2022-07-12 PROBLEM — E04.1 THYROID NODULE: Status: ACTIVE | Noted: 2022-07-12

## 2022-07-12 PROCEDURE — 99232 SBSQ HOSP IP/OBS MODERATE 35: CPT | Performed by: SURGERY

## 2022-07-12 PROCEDURE — 6360000002 HC RX W HCPCS

## 2022-07-12 PROCEDURE — 2580000003 HC RX 258

## 2022-07-12 PROCEDURE — 6370000000 HC RX 637 (ALT 250 FOR IP): Performed by: STUDENT IN AN ORGANIZED HEALTH CARE EDUCATION/TRAINING PROGRAM

## 2022-07-12 PROCEDURE — 6370000000 HC RX 637 (ALT 250 FOR IP)

## 2022-07-12 PROCEDURE — 1200000000 HC SEMI PRIVATE

## 2022-07-12 RX ORDER — METHOCARBAMOL 500 MG/1
1000 TABLET, FILM COATED ORAL 4 TIMES DAILY
Status: DISCONTINUED | OUTPATIENT
Start: 2022-07-12 | End: 2022-07-18 | Stop reason: HOSPADM

## 2022-07-12 RX ORDER — POTASSIUM CHLORIDE 7.45 MG/ML
10 INJECTION INTRAVENOUS
Status: DISPENSED | OUTPATIENT
Start: 2022-07-12 | End: 2022-07-12

## 2022-07-12 RX ADMIN — ACETAMINOPHEN 325MG 650 MG: 325 TABLET ORAL at 20:06

## 2022-07-12 RX ADMIN — POTASSIUM CHLORIDE 10 MEQ: 7.46 INJECTION, SOLUTION INTRAVENOUS at 14:15

## 2022-07-12 RX ADMIN — HYDROMORPHONE HYDROCHLORIDE 0.5 MG: 1 INJECTION, SOLUTION INTRAMUSCULAR; INTRAVENOUS; SUBCUTANEOUS at 10:46

## 2022-07-12 RX ADMIN — HYDROMORPHONE HYDROCHLORIDE 0.5 MG: 1 INJECTION, SOLUTION INTRAMUSCULAR; INTRAVENOUS; SUBCUTANEOUS at 00:20

## 2022-07-12 RX ADMIN — POTASSIUM CHLORIDE 10 MEQ: 7.46 INJECTION, SOLUTION INTRAVENOUS at 18:51

## 2022-07-12 RX ADMIN — POTASSIUM CHLORIDE 10 MEQ: 7.46 INJECTION, SOLUTION INTRAVENOUS at 15:19

## 2022-07-12 RX ADMIN — HYDROMORPHONE HYDROCHLORIDE 0.5 MG: 1 INJECTION, SOLUTION INTRAMUSCULAR; INTRAVENOUS; SUBCUTANEOUS at 06:25

## 2022-07-12 RX ADMIN — METHOCARBAMOL 1000 MG: 500 TABLET ORAL at 17:33

## 2022-07-12 RX ADMIN — SENNOSIDES AND DOCUSATE SODIUM 1 TABLET: 50; 8.6 TABLET ORAL at 20:06

## 2022-07-12 RX ADMIN — HYDROMORPHONE HYDROCHLORIDE 0.5 MG: 1 INJECTION, SOLUTION INTRAMUSCULAR; INTRAVENOUS; SUBCUTANEOUS at 17:33

## 2022-07-12 RX ADMIN — OXYCODONE HYDROCHLORIDE 10 MG: 10 TABLET ORAL at 14:21

## 2022-07-12 RX ADMIN — POTASSIUM CHLORIDE 10 MEQ: 7.46 INJECTION, SOLUTION INTRAVENOUS at 19:57

## 2022-07-12 RX ADMIN — POTASSIUM CHLORIDE 10 MEQ: 7.46 INJECTION, SOLUTION INTRAVENOUS at 16:43

## 2022-07-12 RX ADMIN — HYDROMORPHONE HYDROCHLORIDE 0.5 MG: 1 INJECTION, SOLUTION INTRAMUSCULAR; INTRAVENOUS; SUBCUTANEOUS at 23:31

## 2022-07-12 RX ADMIN — OXYCODONE HYDROCHLORIDE 10 MG: 10 TABLET ORAL at 04:46

## 2022-07-12 RX ADMIN — SODIUM CHLORIDE, POTASSIUM CHLORIDE, SODIUM LACTATE AND CALCIUM CHLORIDE: 600; 310; 30; 20 INJECTION, SOLUTION INTRAVENOUS at 03:12

## 2022-07-12 RX ADMIN — SODIUM CHLORIDE, POTASSIUM CHLORIDE, SODIUM LACTATE AND CALCIUM CHLORIDE: 600; 310; 30; 20 INJECTION, SOLUTION INTRAVENOUS at 17:36

## 2022-07-12 RX ADMIN — POTASSIUM CHLORIDE 10 MEQ: 7.46 INJECTION, SOLUTION INTRAVENOUS at 17:48

## 2022-07-12 RX ADMIN — OXYCODONE HYDROCHLORIDE 10 MG: 10 TABLET ORAL at 09:48

## 2022-07-12 RX ADMIN — HYDROMORPHONE HYDROCHLORIDE 0.5 MG: 1 INJECTION, SOLUTION INTRAMUSCULAR; INTRAVENOUS; SUBCUTANEOUS at 03:12

## 2022-07-12 RX ADMIN — SODIUM CHLORIDE, PRESERVATIVE FREE 10 ML: 5 INJECTION INTRAVENOUS at 20:33

## 2022-07-12 RX ADMIN — ACETAMINOPHEN 325MG 650 MG: 325 TABLET ORAL at 14:21

## 2022-07-12 RX ADMIN — SODIUM CHLORIDE, PRESERVATIVE FREE 10 ML: 5 INJECTION INTRAVENOUS at 23:31

## 2022-07-12 RX ADMIN — HYDROMORPHONE HYDROCHLORIDE 0.5 MG: 1 INJECTION, SOLUTION INTRAMUSCULAR; INTRAVENOUS; SUBCUTANEOUS at 20:32

## 2022-07-12 RX ADMIN — ONDANSETRON 4 MG: 2 INJECTION INTRAMUSCULAR; INTRAVENOUS at 15:22

## 2022-07-12 RX ADMIN — METHOCARBAMOL 1000 MG: 500 TABLET ORAL at 20:06

## 2022-07-12 ASSESSMENT — PAIN DESCRIPTION - ORIENTATION
ORIENTATION: LEFT
ORIENTATION: RIGHT;LEFT
ORIENTATION: LEFT

## 2022-07-12 ASSESSMENT — PAIN DESCRIPTION - LOCATION
LOCATION: HIP
LOCATION: ELBOW;HIP
LOCATION: HIP
LOCATION: HIP
LOCATION: HIP;ELBOW
LOCATION: HIP
LOCATION: HIP
LOCATION: ELBOW;HIP
LOCATION: HIP
LOCATION: HIP
LOCATION: ELBOW;HIP
LOCATION: HIP

## 2022-07-12 ASSESSMENT — PAIN DESCRIPTION - PAIN TYPE
TYPE: ACUTE PAIN

## 2022-07-12 ASSESSMENT — PAIN - FUNCTIONAL ASSESSMENT
PAIN_FUNCTIONAL_ASSESSMENT: PREVENTS OR INTERFERES SOME ACTIVE ACTIVITIES AND ADLS
PAIN_FUNCTIONAL_ASSESSMENT: PREVENTS OR INTERFERES WITH ALL ACTIVE AND SOME PASSIVE ACTIVITIES
PAIN_FUNCTIONAL_ASSESSMENT: PREVENTS OR INTERFERES WITH ALL ACTIVE AND SOME PASSIVE ACTIVITIES
PAIN_FUNCTIONAL_ASSESSMENT: PREVENTS OR INTERFERES SOME ACTIVE ACTIVITIES AND ADLS
PAIN_FUNCTIONAL_ASSESSMENT: PREVENTS OR INTERFERES WITH ALL ACTIVE AND SOME PASSIVE ACTIVITIES
PAIN_FUNCTIONAL_ASSESSMENT: INTOLERABLE, UNABLE TO DO ANY ACTIVE OR PASSIVE ACTIVITIES
PAIN_FUNCTIONAL_ASSESSMENT: PREVENTS OR INTERFERES SOME ACTIVE ACTIVITIES AND ADLS
PAIN_FUNCTIONAL_ASSESSMENT: PREVENTS OR INTERFERES SOME ACTIVE ACTIVITIES AND ADLS

## 2022-07-12 ASSESSMENT — PAIN SCALES - GENERAL
PAINLEVEL_OUTOF10: 10
PAINLEVEL_OUTOF10: 7
PAINLEVEL_OUTOF10: 9
PAINLEVEL_OUTOF10: 8
PAINLEVEL_OUTOF10: 3
PAINLEVEL_OUTOF10: 7
PAINLEVEL_OUTOF10: 9
PAINLEVEL_OUTOF10: 7
PAINLEVEL_OUTOF10: 5
PAINLEVEL_OUTOF10: 7
PAINLEVEL_OUTOF10: 10
PAINLEVEL_OUTOF10: 8
PAINLEVEL_OUTOF10: 0
PAINLEVEL_OUTOF10: 8
PAINLEVEL_OUTOF10: 7
PAINLEVEL_OUTOF10: 10
PAINLEVEL_OUTOF10: 8

## 2022-07-12 ASSESSMENT — PAIN DESCRIPTION - DESCRIPTORS
DESCRIPTORS: ACHING;DISCOMFORT;SORE
DESCRIPTORS: ACHING;DISCOMFORT;SORE
DESCRIPTORS: ACHING;DISCOMFORT;POUNDING
DESCRIPTORS: ACHING;DISCOMFORT;SORE
DESCRIPTORS: ACHING;DISCOMFORT;THROBBING
DESCRIPTORS: SPASM;STABBING;THROBBING
DESCRIPTORS: THROBBING;STABBING;SPASM
DESCRIPTORS: THROBBING;SHOOTING;DISCOMFORT
DESCRIPTORS: ACHING;DISCOMFORT;SORE
DESCRIPTORS: ACHING;DISCOMFORT;THROBBING

## 2022-07-12 ASSESSMENT — PAIN DESCRIPTION - ONSET
ONSET: ON-GOING
ONSET: PROGRESSIVE

## 2022-07-12 ASSESSMENT — PAIN DESCRIPTION - FREQUENCY
FREQUENCY: CONTINUOUS

## 2022-07-12 NOTE — PROGRESS NOTES
Physical Therapy  Facility/Department: [facility]    Name: Jarrell Ribeiro  : 1980  MRN: [id number]  Date of Service: 2022  Attempted PT evaluation. Pt scheduled for OR this date. Will follow with pt tomorrow as schedule allows.     Jennifer Monsalve, PT, DPT  [id number]
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
## 2022-07-12 NOTE — CARE COORDINATION
7/12/2022 social work transition of care planning  Assessment completed with pt at bedside. Pt is from home with family and had been independent. Pt pcp is Dr. Jose Maria Ravi and pharmacy is [facility] in [facility]. Explained social work role in transition of care planning. Will need post op therapies to assist with transition of care planning. Pt agreeable to acute rehab here,if appropriate. Sw left [facility] and [facility] lists at bedside. SW/Cm to follow. The Plan for Transition of Care is related to the following treatment goals: improvement of functioning    The Patient and/or patient representative was provided with a choice of provider and agrees   with the discharge plan. [x] Yes [] No    Freedom of choice list was provided with basic dialogue that supports the patient's individualized plan of care/goals, treatment preferences and shares the quality data associated with the providers.  [x] Yes [] No  Electronically signed by FRED Stover on 7/12/2022 at 1:35 PM
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
## 2022-07-12 NOTE — PLAN OF CARE
Problem: Skin/Tissue Integrity  Goal: Absence of new skin breakdown  Description: 1. Monitor for areas of redness and/or skin breakdown  2. Assess vascular access sites hourly  3. Every 4-6 hours minimum:  Change oxygen saturation probe site  4. Every 4-6 hours:  If on nasal continuous positive airway pressure, respiratory therapy assess nares and determine need for appliance change or resting period.   Outcome: Progressing     Problem: Safety - Adult  Goal: Free from fall injury  Outcome: Progressing     Problem: ABCDS Injury Assessment  Goal: Absence of physical injury  Outcome: Progressing     Problem: Pain  Goal: Verbalizes/displays adequate comfort level or baseline comfort level  7/12/2022 1040 by Bebeto Hardin RN  Outcome: Progressing  7/12/2022 0322 by Siva Elizabeth RN  Outcome: Progressing
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
## 2022-07-12 NOTE — PROGRESS NOTES
PCP is Tomas Elaine DO  Office notified of admission.       Electronically signed by Vinod Neely RN MSN APRN-NP [facility] NP  [facility] CCRN 7/12/2022 12:50 PM

## 2022-07-12 NOTE — DISCHARGE SUMMARY
7/16: 10/24 with PT. Awaiting placement. 7/17: No acute events overnight. Awaiting placement. 7/18:  No overnight events. Awaiting placement    Consults:   IP CONSULT TO ORTHOPEDIC SURGERY  IP CONSULT TO PHYSICAL MEDICINE REHAB    Significant Diagnostic Studies:   XR PELVIS (1-2 VIEWS)    Result Date: 7/11/2022  EXAMINATION: ONE XRAY VIEW OF THE PELVIS 7/11/2022 2:30 pm COMPARISON: None. HISTORY: ORDERING SYSTEM PROVIDED HISTORY: Trauma TECHNOLOGIST PROVIDED HISTORY: Reason for exam:->trauma What reading provider will be dictating this exam?->CRC FINDINGS: Suspect nondisplaced left superior and inferior pubic rami fractures. Bilateral hips demonstrate normal alignment. No focal osseous lesion. SI joints are symmetric. Surgical clip in the right hemipelvis. Suspect nondisplaced fractures, left superior and inferior pubic rami. XR ELBOW LEFT (2 VIEWS)    Result Date: 7/11/2022  EXAMINATION: TWO XRAY VIEWS OF THE LEFT ELBOW 7/11/2022 4:24 pm COMPARISON: None. HISTORY: ORDERING SYSTEM PROVIDED HISTORY: elbow pain s/p trauma TECHNOLOGIST PROVIDED HISTORY: Reason for exam:->elbow pain s/p trauma What reading provider will be dictating this exam?->CRC FINDINGS: There is lateral condylar fracture with fractured fragment displaced anteriorly. Adjacent soft tissue swelling noted. There are small radiopaque densities in the dorsal and medial aspect proximal to the elbow. Displaced lateral condylar fracture. Tiny radiopaque foreign bodies on the dorsal and medial side, proximal to the elbow joint.      XR ELBOW LEFT (MIN 3 VIEWS)    Result Date: 7/11/2022  EXAMINATION: THREE XRAY VIEWS OF THE LEFT ELBOW 7/11/2022 6:28 pm COMPARISON: Left elbow series from July 11, 2022 at 16:13 HISTORY: ORDERING SYSTEM PROVIDED HISTORY: post reduction at bedside TECHNOLOGIST PROVIDED HISTORY: Reason for exam:->post reduction at bedside What reading provider will be dictating this exam?->CRC FINDINGS: Displaced fracture of the left elbow. Per history, interval reduction and subsequent placement of cast material.  Elbow joint effusion and soft tissue edema noted. Displaced fracture about the left elbow. Donor site appears to be lateral condyle. Per history, interval reduction and placement of cast material.     XR HIP LEFT (1 VIEW)    Result Date: 7/11/2022  EXAMINATION: ONE XRAY VIEW OF THE LEFT HIP 7/11/2022 2:30 pm COMPARISON: None. HISTORY: ORDERING SYSTEM PROVIDED HISTORY: Trauma TECHNOLOGIST PROVIDED HISTORY: Reason for exam:->trauma What reading provider will be dictating this exam?->CRC FINDINGS: There are cortical step ups in the left superior and inferior pubic rami. The left hip is intact. Soft tissues appear grossly unremarkable. Suspect nondisplaced left superior and inferior pubic rami fractures. CT HEAD WO CONTRAST    Result Date: 7/11/2022  EXAMINATION: CT OF THE HEAD WITHOUT CONTRAST  7/11/2022 2:47 pm TECHNIQUE: CT of the head was performed without the administration of intravenous contrast. Automated exposure control, iterative reconstruction, and/or weight based adjustment of the mA/kV was utilized to reduce the radiation dose to as low as reasonably achievable. COMPARISON: None. HISTORY: ORDERING SYSTEM PROVIDED HISTORY: trauma TECHNOLOGIST PROVIDED HISTORY: Reason for exam:->trauma Has a \"code stroke\" or \"stroke alert\" been called? ->No Decision Support Exception - unselect if not a suspected or confirmed emergency medical condition->Emergency Medical Condition (MA) What reading provider will be dictating this exam?->CRC FINDINGS: BRAIN/VENTRICLES: There is no acute intracranial hemorrhage, mass effect or midline shift. No abnormal extra-axial fluid collection. The gray-white differentiation is maintained without evidence of an acute infarct. There is no evidence of hydrocephalus. ORBITS: The visualized portion of the orbits demonstrate no acute abnormality.  SINUSES: There is mild mucosal thickening in bilateral maxillary sinuses. SOFT TISSUES/SKULL:  No acute abnormality of the visualized skull or soft tissues. No acute intracranial abnormality. Chronic bilateral maxillary sinusitis. RECOMMENDATIONS: Unavailable     CT CHEST W CONTRAST    Result Date: 7/11/2022  EXAMINATION: CT OF THE CHEST WITH CONTRAST 7/11/2022 2:47 pm TECHNIQUE: CT of the chest was performed with the administration of intravenous contrast. Multiplanar reformatted images are provided for review. Automated exposure control, iterative reconstruction, and/or weight based adjustment of the mA/kV was utilized to reduce the radiation dose to as low as reasonably achievable. COMPARISON: Chest x-ray 1 hour prior HISTORY: ORDERING SYSTEM PROVIDED HISTORY: trauma TECHNOLOGIST PROVIDED HISTORY: Reason for exam:->trauma Decision Support Exception - unselect if not a suspected or confirmed emergency medical condition->Emergency Medical Condition (MA) What reading provider will be dictating this exam?->CRC FINDINGS: Mediastinum: Thyroid is homogeneous in attenuation. No bulky mediastinal adenopathy. Central airways are patent. Esophagus is normal course and caliber. Cardiac size within normal limits without pericardial effusion. Lungs/pleura: Lungs are clear without focal opacification or consolidation. Subsegmental dependent atelectasis without consolidation. No dominant nodule or mass lesion. No pleural effusion or pleural process. Upper Abdomen: Visualized portions of the upper abdomen unremarkable. Soft Tissues/Bones: No acute osseous or soft tissue findings. No aggressive osseous lesion. No acute traumatic findings of the chest.  Please see separate dictation for CT thoracic spine. CT CERVICAL SPINE WO CONTRAST    Addendum Date: 7/11/2022    ADDENDUM: Please make a note of 10 mm left thyroid nodule described in the body of the report.   Consider follow-up with non emergent thyroid sonogram.     Result Date: 7/11/2022  EXAMINATION: CT OF THE CERVICAL SPINE WITHOUT CONTRAST 7/11/2022 2:47 pm TECHNIQUE: CT of the cervical spine was performed without the administration of intravenous contrast. Multiplanar reformatted images are provided for review. Automated exposure control, iterative reconstruction, and/or weight based adjustment of the mA/kV was utilized to reduce the radiation dose to as low as reasonably achievable. COMPARISON: None. HISTORY: ORDERING SYSTEM PROVIDED HISTORY: trauma TECHNOLOGIST PROVIDED HISTORY: Reason for exam:->trauma Decision Support Exception - unselect if not a suspected or confirmed emergency medical condition->Emergency Medical Condition (MA) What reading provider will be dictating this exam?->CRC FINDINGS: BONES/ALIGNMENT: There is no acute fracture or traumatic malalignment. There are partially visualized questionable left 2nd and 3rd rib fractures. DEGENERATIVE CHANGES: There are mild multilevel degenerative changes. SOFT TISSUES: There is no prevertebral soft tissue swelling. There is a 10 mm left thyroid nodule. No acute abnormality of the cervical spine. Question partially visualized left 2nd and 3rd rib fractures. Consider left rib series if clinically appropriate. RECOMMENDATIONS: Unavailable     CT THORACIC SPINE WO CONTRAST    Result Date: 7/11/2022  EXAMINATION: CT OF THE THORACIC SPINE WITHOUT CONTRAST  7/11/2022 2:47 pm: TECHNIQUE: CT of the thoracic spine was performed without the administration of intravenous contrast. Multiplanar reformatted images are provided for review. Automated exposure control, iterative reconstruction, and/or weight based adjustment of the mA/kV was utilized to reduce the radiation dose to as low as reasonably achievable. COMPARISON: None.  HISTORY: ORDERING SYSTEM PROVIDED HISTORY: trauma TECHNOLOGIST PROVIDED HISTORY: Reason for exam:->trauma What reading provider will be dictating this exam?->CRC FINDINGS: BONES/ALIGNMENT: There is normal alignment of the spine. The vertebral body heights are maintained. No osseous destructive lesion is seen. DEGENERATIVE CHANGES: No gross spinal canal stenosis or bony neural foraminal narrowing of the thoracic spine. SOFT TISSUES: No paraspinal mass is seen. Visualized lungs: There are gravity dependent changes in both lung bases. No acute bony abnormality of the thoracic spine. Gravity dependent changes in both lung bases. RECOMMENDATIONS: Unavailable     CT LUMBAR SPINE WO CONTRAST    Result Date: 7/11/2022  EXAMINATION: CT OF THE LUMBAR SPINE WITHOUT CONTRAST  7/11/2022 TECHNIQUE: CT of the lumbar spine was performed without the administration of intravenous contrast. Multiplanar reformatted images are provided for review. Adjustment of mA and/or kV according to patient size was utilized. Automated exposure control, iterative reconstruction, and/or weight based adjustment of the mA/kV was utilized to reduce the radiation dose to as low as reasonably achievable. COMPARISON: None HISTORY: ORDERING SYSTEM PROVIDED HISTORY: trauma TECHNOLOGIST PROVIDED HISTORY: Reason for exam:->trauma Decision Support Exception - unselect if not a suspected or confirmed emergency medical condition->Emergency Medical Condition (MA) What reading provider will be dictating this exam?->CRC FINDINGS: BONES/ALIGNMENT: There is normal alignment of the spine. The vertebral body heights are maintained. No osseous destructive lesion is seen. There are multifocal incomplete and minimally displaced fractures along the anterior margin of the left sacrum. DEGENERATIVE CHANGES: No significant degenerative changes of the lumbar spine. SOFT TISSUES/RETROPERITONEUM: No paraspinal mass is seen. No acute bony abnormality of the lumbar spine. Multifocal minimally displaced fractures along the anterior margin of left sacrum.  RECOMMENDATIONS: Unavailable     CT ABDOMEN PELVIS W IV CONTRAST Additional Contrast? None    Result Date: 7/11/2022  EXAMINATION: CT OF THE ABDOMEN AND PELVIS WITH CONTRAST 7/11/2022 2:47 pm TECHNIQUE: CT of the abdomen and pelvis was performed with the administration of intravenous contrast. Multiplanar reformatted images are provided for review. Automated exposure control, iterative reconstruction, and/or weight based adjustment of the mA/kV was utilized to reduce the radiation dose to as low as reasonably achievable. COMPARISON: None. HISTORY: ORDERING SYSTEM PROVIDED HISTORY: trauma TECHNOLOGIST PROVIDED HISTORY: Reason for exam:->trauma Additional Contrast?->None Decision Support Exception - unselect if not a suspected or confirmed emergency medical condition->Emergency Medical Condition (MA) What reading provider will be dictating this exam?->CRC FINDINGS: Lower Chest: There are gravity dependent changes and atelectasis in the visualized lung bases. No evidence of pneumothorax or hemothorax. Organs: The liver, gallbladder, spleen, pancreas, adrenal glands and kidneys are intact with no evidence of injury. GI/Bowel: No evidence of bowel perforation or obstruction. There is a large amount of retained stool in the colon. Pelvis: Bladder is suboptimally distended with no evidence of perforation. There is minimal free fluid in the cul-de-sac. The uterus is present. Peritoneum/Retroperitoneum: No free air, hematoma or adenopathy. Bones/Soft Tissues: There are minimally displaced left anterior sacral, left superior and inferior pubic rami fractures. No evidence of acute intra-abdominal injury. Minimally displaced left anterior sacral, left superior and inferior pubic rami fractures. Constipation. RECOMMENDATIONS: Unavailable     XR SHOULDER LEFT (MIN 2 VIEWS)    Result Date: 7/11/2022  EXAMINATION: THREE XRAY VIEWS OF THE LEFT SHOULDER 7/11/2022 4:24 pm COMPARISON: None.  HISTORY: ORDERING SYSTEM PROVIDED HISTORY: pain s/p trauma TECHNOLOGIST PROVIDED HISTORY: Reason for exam:->pain s/p trauma What reading provider will be dictating this exam?->CRC FINDINGS: There is no evidence of fracture or dislocation. No significant osteodegenerative or other osseous abnormalities are seen. No significant surrounding soft tissue abnormality is identified. No fracture or dislocation. XR CHEST 1 VIEW    Addendum Date: 7/11/2022    ADDENDUM: The lungs are without acute focal process. There is no effusion or pneumothorax. The cardiomediastinal silhouette is without acute process. The osseous structures are without acute process. Result Date: 7/11/2022  EXAMINATION: ONE XRAY VIEW OF THE CHEST 7/11/2022 2:30 pm COMPARISON: None. HISTORY: ORDERING SYSTEM PROVIDED HISTORY: Trauma TECHNOLOGIST PROVIDED HISTORY: Reason for exam:->trauma What reading provider will be dictating this exam?->CRC     No acute process. CT 3D RECONSTRUCTION    Result Date: 7/11/2022  EXAMINATION: CT OF THE LEFT ELBOW WITHOUT CONTRAST; 3D RECONSTRUCTIONS 7/11/2022 9:56 pm TECHNIQUE: CT of the left elbow was performed without the administration of intravenous contrast.  Multiplanar reformatted images are provided for review. Automated exposure control, iterative reconstruction, and/or weight based adjustment of the mA/kV was utilized to reduce the radiation dose to as low as reasonably achievable. ; 3D reconstructions were performed on a separate workstation. Automated exposure control, iterative reconstruction, and/or weight based adjustment of the mA/kV was utilized to reduce the radiation dose to as low as reasonably achievable. COMPARISON: Left elbow series from the same day at 18:02 HISTORY ORDERING SYSTEM PROVIDED HISTORY: fracture TECHNOLOGIST PROVIDED HISTORY: Reason for exam:->fracture FINDINGS: Bones: The radius and ulna appear grossly intact. There is a displaced fracture of the capitellum which is rotated in purged on the radial head best seen on series 7, image 46. Slightly displaced fracture of the trochlea which is intra-articular.   The trochlea appears to approximate properly with the ulna. Soft Tissue: Imaged left lung is clear. Intramuscular edema and superficial soft tissue contusions about the left elbow. Neurovascular structures appear grossly intact given the limitations of this noncontrast technique. Joint: Left elbow joint effusion and fat fluid levels. No significant degenerative spurring. 3D volumetric reformats acquired to support subspecialty cervicis. 1.  Displaced and rotated fracture of the capitellum which appears perched on the radial head. 2.  Very minimally displaced intra-articular fracture of the trochlea. The articulation between the trochlea on the ulna appears properly aligned. 3.  The radius and ulna appear intact on this exam. 4.  Left elbow joint effusion. Soft tissue contusion about the left elbow joint     CT ELBOW LEFT WO CONTRAST    Result Date: 7/11/2022  EXAMINATION: CT OF THE LEFT ELBOW WITHOUT CONTRAST; 3D RECONSTRUCTIONS 7/11/2022 9:56 pm TECHNIQUE: CT of the left elbow was performed without the administration of intravenous contrast.  Multiplanar reformatted images are provided for review. Automated exposure control, iterative reconstruction, and/or weight based adjustment of the mA/kV was utilized to reduce the radiation dose to as low as reasonably achievable. ; 3D reconstructions were performed on a separate workstation. Automated exposure control, iterative reconstruction, and/or weight based adjustment of the mA/kV was utilized to reduce the radiation dose to as low as reasonably achievable. COMPARISON: Left elbow series from the same day at 18:02 HISTORY ORDERING SYSTEM PROVIDED HISTORY: fracture TECHNOLOGIST PROVIDED HISTORY: Reason for exam:->fracture FINDINGS: Bones: The radius and ulna appear grossly intact. There is a displaced fracture of the capitellum which is rotated in purged on the radial head best seen on series 7, image 46.   Slightly displaced fracture of the trochlea which is intra-articular. The trochlea appears to approximate properly with the ulna. Soft Tissue: Imaged left lung is clear. Intramuscular edema and superficial soft tissue contusions about the left elbow. Neurovascular structures appear grossly intact given the limitations of this noncontrast technique. Joint: Left elbow joint effusion and fat fluid levels. No significant degenerative spurring. 3D volumetric reformats acquired to support subspecialty cervicis. 1.  Displaced and rotated fracture of the capitellum which appears perched on the radial head. 2.  Very minimally displaced intra-articular fracture of the trochlea. The articulation between the trochlea on the ulna appears properly aligned. 3.  The radius and ulna appear intact on this exam. 4.  Left elbow joint effusion. Soft tissue contusion about the left elbow joint       Discharge Exam:  Gen - no apparent distress  Neuro - Awake, alert, attentive    HEENT - PERRL 3mm conj pink   Lungs - non labored, BS clear b/l    Heart - RR no extra heart sounds    Abdomen - soft non tender  Ext- LUE dressings in place NVI,      Disposition: home    In process/preliminary results:  Outstanding Order Results       No orders found from 6/12/2022 to 7/12/2022. Patient Instructions:   Current Discharge Medication List             Details   ibuprofen (ADVIL;MOTRIN) 600 MG tablet Take 1 tablet by mouth every 6 hours as needed for Pain  Qty: 120 tablet, Refills: 0      acetaminophen (TYLENOL) 325 MG tablet Take 2 tablets by mouth in the morning and 2 tablets at noon and 2 tablets in the evening and 2 tablets before bedtime. Qty: 120 tablet, Refills: 0      gabapentin (NEURONTIN) 100 MG capsule Take 2 capsules by mouth in the morning and 2 capsules at noon and 2 capsules before bedtime. Do all this for 30 days.   Qty: 180 capsule, Refills: 0      methocarbamol (ROBAXIN) 500 MG tablet Take 2 tablets by mouth in the morning and 2 tablets at noon and 2 tablets in the evening and 2 tablets before bedtime. Do all this for 10 days. Qty: 80 tablet, Refills: 0      oxyCODONE (ROXICODONE) 5 MG immediate release tablet Take 1 tablet by mouth every 6 hours as needed for Pain for up to 7 days. Intended supply: 7 days. Take lowest dose possible to manage pain  Qty: 28 tablet, Refills: 0    Comments: Reduce doses taken as pain becomes manageable  Associated Diagnoses: Fracture of humerus, lateral condyle, closed, left, initial encounter             Discharge Instructions  [address]    Call [phone], option 2, for any questions/concerns. Please follow the instructions checked below:    During the course of your workup, we identified an incidental finding of: Thyroid nodule  Please follow-up with your primary care provider. ACTIVITY INSTRUCTIONS  Increase activity as tolerated  No heavy lifting or strenuous activity  Take your incentive spirometer home and use 4-6 times/day   [x]  No driving until cleared by Mauro Dihel ([id number] [facility] surgery). WOUND/DRESSING INSTRUCTIONS:  You may shower. No sitting in bath tub, hot tub or swimming until cleared by physician. Ice to areas of pain for first 24 hours. Heat to areas of pain after that. Wash areas of lacerations/abrasions with soap & water. Rinse well. Pat dry with clean towel. Apply thin layer of Bacitracin, Neosporin, or triple antibiotic cream to affected area 2-3 times per day. Keep wounds clean and dry. []  Sutures/Staples are to be removed    MEDICATION INSTRUCTIONS  Take medication as prescribed. When taking pain medications, you may experience dizziness or drowsiness. Do not drink alcohol or drive when taking these medications. You may experience constipation while taking pain medication. You may take over the counter stool softeners such as docusate (Colace), sennosides S (Senokot-S), or Miralax.   []  You may take Ibuprofen (over the counter) as directed for mild pain. --You may take up to 800mg every 8 hours for pain, please take with food or milk.   []  You may take acetaminophen (Tylenol) products. Do NOT take more than 4000mg of Tylenol in 24h. []  Do not take any other acetaminophen (Tylenol) products if you are taking Percocet or Norco, as these contain Tylenol. --Do NOT take more than 4000mg of Tylenol in 24h. OPIOID MEDICATION INSTRUCTIONS  Read the medication guide that is included with your prescription. Take your medication exactly as prescribed. Store medication away from children and in a safe place. Do NOT share your medication with others. Do NOT take medication unless it is prescribed for you. Do NOT drink alcohol while taking opioids (I.e., Norco, Percocet, Oxycodone, etc). Discuss with the Trauma Clinic staff if the dose of medication you are taking does not control your pain and any side effects that you may be having. CALL 911 OR YOUR LOCAL EMERGENCY SERVICE:  --If you take too much medication  --If you have trouble breathing or shortness of breath  --A child has taken this medication. WORK:  You may not return to work until you receive follow-up with the Trauma Clinic or clearance by all consultants. Call the trauma clinic for any of the following or for questions/concerns;  --fever over 101F  --redness, swelling, hardness or warmth at the wound site(s). --Unrelieved nausea/vomiting  --Foul smelling or cloudy drainage at the wound site(s)  --Unrelieved pain or increase in pain  --Increase in shortness of breath    Follow-up:  Trauma Clinic: [phone] [place] [address] Radha Harris    Orthopedic Hand Surgery Discharge Instructions    ACTIVITY INSTRUCTIONS:    Elevate extremity to help reduce swelling. Use Purple Pillow for elevation of hand/arm   Use sling as needed. Non-weight bearing to the left upper extremity.     WOUND/DRESSING INSTRUCTIONS:  Always until cleared by physician. Ice to areas of pain for first 24 hours. Heat to areas of pain after that. Wash areas of lacerations/abrasions with soap & water. Rinse well. Pat dry with clean towel. Apply thin layer of Bacitracin, Neosporin, or triple antibiotic cream to affected area 2-3 times per day. Keep wounds clean and dry. []  Sutures/Staples are to be removed     MEDICATION INSTRUCTIONS  Take medication as prescribed. When taking pain medications, you may experience dizziness or drowsiness. Do not drink alcohol or drive when taking these medications. You may experience constipation while taking pain medication. You may take over the counter stool softeners such as docusate (Colace), sennosides S (Senokot-S), or Miralax. [x]  You may take Ibuprofen (over the counter) as directed for mild pain. --You may take up to 800mg every 8 hours for pain, please take with food or milk. [x]  You may take acetaminophen (Tylenol) products. Do NOT take more than 4000mg of Tylenol in 24h. [x]  Do not take any other acetaminophen (Tylenol) products if you are taking Percocet or Norco, as these contain Tylenol. --Do NOT take more than 4000mg of Tylenol in 24h. OPIOID MEDICATION INSTRUCTIONS  Read the medication guide that is included with your prescription. Take your medication exactly as prescribed. Store medication away from children and in a safe place. Do NOT share your medication with others. Do NOT take medication unless it is prescribed for you. Do NOT drink alcohol while taking opioids (I.e., Norco, Percocet, Oxycodone, etc). Discuss with the Trauma Clinic staff if the dose of medication you are taking does not control your pain and any side effects that you may be having. CALL 911 OR YOUR LOCAL EMERGENCY SERVICE:  --If you take too much medication  --If you have trouble breathing or shortness of breath  --A child has taken this medication.     WORK:  You may not return to work until you receive follow-up with the Trauma Clinic or clearance by all consultants. Call the trauma clinic for any of the following or for questions/concerns;  --fever over 101F  --redness, swelling, hardness or warmth at the wound site(s). --Unrelieved nausea/vomiting  --Foul smelling or cloudy drainage at the wound site(s)  --Unrelieved pain or increase in pain  --Increase in shortness of breath    Follow-up:  Trauma Clinic: [phone] option [address]  [place], [phone] [place]      Follow up:    Mindi Agosto DO  [address]  [place] [address]  [phone]    Schedule an appointment as soon as possible for a visit in 2 weeks  For follow up after discharge    Ulises Núñez, [phone]    Schedule an appointment as soon as possible for a visit in 2 weeks  For follow up    [facility] [employer] [id number]  Call in 2 week(s)  for follow up    MD Giovanni Chau 78  [phone] [facility] [address] [phone]    In 2 weeks      [address]  [address] [place] [address]  [phone]       Time spent on discharge: more than 30 minutes  Signed:  GARY Flood NP  7/18/2022  2:05 PM
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
## 2022-07-12 NOTE — PROGRESS NOTES
Department of Orthopedic Surgery  Resident Progress Note    Patient seen and examined. Pain significant. Patient is tearful. She states that the pain is mainly to her left hip. Denies chest pain, shortness of breath, calf pain, dizziness/lightheadedness. VITALS:  /60   Pulse 85   Temp 98 °F (36.7 °C) (Temporal)   Resp 18   Ht 5' (1.524 m)   Wt 140 lb (63.5 kg)   LMP 06/28/2022   SpO2 98%   BMI 27.34 kg/m²     GENERAL: alert and oriented  MUSCULOSKELETAL:   bilateral lower extremity:    · Compartments soft and compressible, calf non-tender  · Palpable dorsalis pedis and posterior tibialis pulse, brisk cap refill to toes, foot warm and perfused  · Sensation intact to light touch in sural/deep peroneal/superficial peroneal/saphenous/posterior tibial nerve distributions to foot/ankle. · Demonstrates active ankle plantar/dorsiflexion/great toe extension  Left upper extremity:  Splint intact  No pain to shoulder or hand  +AIN/PIN/median/radial/ulnar nerves  SILT median/radial/ulnar nerve distributions  Brisk capillary refill throughout    CBC:   Lab Results   Component Value Date/Time    WBC 10.6 07/11/2022 02:15 PM    HGB 13.6 07/11/2022 02:15 PM    HCT 40.2 07/11/2022 02:15 PM     07/11/2022 02:15 PM       ASSESSMENT  · L LC1 pelvis fractures  · L distal humerus capitellum and trochlea fractures    PLAN    · Discussed with patient the complexity of her fracture to the distal humerus.  Explained that her fracture fixation will be deferred to Dr. China Dowell as he is a hand and upper extremity specialist. Patient is in agreement  · NPO at midnight  · Treatment consent  · Plan for ORIF left distal humerus tomorrow 7/13/22  · Discussed with Dr. China Dowell

## 2022-07-12 NOTE — PROGRESS NOTES
OCCUPATIONAL THERAPY TREATMENT NOTE     [facility]      OT BEDSIDE TREATMENT NOTE      Date:2022  Patient Name: Daniel Myers  MRN: [id number]  : 1980  Room: [address] [place] [address]     OT orders received & appreciated, chart reviewed. Pt. scheduled for OR for ORIF L distal humerus. Will follow post-op in accordance with Ortho orders/ POC. Thank you,  Radha Queen.  Román OTR/L   License #  [id number]

## 2022-07-13 ENCOUNTER — APPOINTMENT (OUTPATIENT)
Dept: GENERAL RADIOLOGY | Age: 42
DRG: 493 | End: 2022-07-13
Payer: OTHER GOVERNMENT

## 2022-07-13 ENCOUNTER — ANESTHESIA (OUTPATIENT)
Dept: OPERATING ROOM | Age: 42
DRG: 493 | End: 2022-07-13
Payer: OTHER GOVERNMENT

## 2022-07-13 LAB
ABO/RH: NORMAL
ANION GAP SERPL CALCULATED.3IONS-SCNC: 13 MMOL/L (ref 7–16)
ANION GAP SERPL CALCULATED.3IONS-SCNC: 9 MMOL/L (ref 7–16)
ANTIBODY SCREEN: NORMAL
BASOPHILS ABSOLUTE: 0.05 E9/L (ref 0–0.2)
BASOPHILS RELATIVE PERCENT: 0.4 % (ref 0–2)
BUN BLDV-MCNC: 3 MG/DL (ref 6–20)
BUN BLDV-MCNC: 4 MG/DL (ref 6–20)
CALCIUM SERPL-MCNC: 8.6 MG/DL (ref 8.6–10.2)
CALCIUM SERPL-MCNC: 8.8 MG/DL (ref 8.6–10.2)
CHLORIDE BLD-SCNC: 103 MMOL/L (ref 98–107)
CHLORIDE BLD-SCNC: 104 MMOL/L (ref 98–107)
CO2: 17 MMOL/L (ref 22–29)
CO2: 21 MMOL/L (ref 22–29)
CREAT SERPL-MCNC: 0.6 MG/DL (ref 0.5–1)
CREAT SERPL-MCNC: 0.7 MG/DL (ref 0.5–1)
EOSINOPHILS ABSOLUTE: 0.03 E9/L (ref 0.05–0.5)
EOSINOPHILS RELATIVE PERCENT: 0.3 % (ref 0–6)
GFR AFRICAN AMERICAN: >60
GFR AFRICAN AMERICAN: >60
GFR NON-AFRICAN AMERICAN: >60 ML/MIN/1.73
GFR NON-AFRICAN AMERICAN: >60 ML/MIN/1.73
GLUCOSE BLD-MCNC: 90 MG/DL (ref 74–99)
GLUCOSE BLD-MCNC: 95 MG/DL (ref 74–99)
HCT VFR BLD CALC: 37.1 % (ref 34–48)
HEMOGLOBIN: 11.9 G/DL (ref 11.5–15.5)
IMMATURE GRANULOCYTES #: 0.03 E9/L
IMMATURE GRANULOCYTES %: 0.3 % (ref 0–5)
LYMPHOCYTES ABSOLUTE: 0.87 E9/L (ref 1.5–4)
LYMPHOCYTES RELATIVE PERCENT: 7.5 % (ref 20–42)
MCH RBC QN AUTO: 29.9 PG (ref 26–35)
MCHC RBC AUTO-ENTMCNC: 32.1 % (ref 32–34.5)
MCV RBC AUTO: 93.2 FL (ref 80–99.9)
MONOCYTES ABSOLUTE: 0.65 E9/L (ref 0.1–0.95)
MONOCYTES RELATIVE PERCENT: 5.6 % (ref 2–12)
NEUTROPHILS ABSOLUTE: 9.93 E9/L (ref 1.8–7.3)
NEUTROPHILS RELATIVE PERCENT: 85.9 % (ref 43–80)
PDW BLD-RTO: 13.3 FL (ref 11.5–15)
PLATELET # BLD: 181 E9/L (ref 130–450)
PMV BLD AUTO: 10.2 FL (ref 7–12)
POTASSIUM SERPL-SCNC: 4.5 MMOL/L (ref 3.5–5)
POTASSIUM SERPL-SCNC: 5.5 MMOL/L (ref 3.5–5)
RBC # BLD: 3.98 E12/L (ref 3.5–5.5)
SODIUM BLD-SCNC: 129 MMOL/L (ref 132–146)
SODIUM BLD-SCNC: 138 MMOL/L (ref 132–146)
WBC # BLD: 11.6 E9/L (ref 4.5–11.5)

## 2022-07-13 PROCEDURE — 85025 COMPLETE CBC W/AUTO DIFF WBC: CPT

## 2022-07-13 PROCEDURE — 3600000015 HC SURGERY LEVEL 5 ADDTL 15MIN: Performed by: ORTHOPAEDIC SURGERY

## 2022-07-13 PROCEDURE — 6360000002 HC RX W HCPCS

## 2022-07-13 PROCEDURE — 86850 RBC ANTIBODY SCREEN: CPT

## 2022-07-13 PROCEDURE — 99222 1ST HOSP IP/OBS MODERATE 55: CPT | Performed by: ORTHOPAEDIC SURGERY

## 2022-07-13 PROCEDURE — 99232 SBSQ HOSP IP/OBS MODERATE 35: CPT | Performed by: SURGERY

## 2022-07-13 PROCEDURE — 3700000001 HC ADD 15 MINUTES (ANESTHESIA): Performed by: ORTHOPAEDIC SURGERY

## 2022-07-13 PROCEDURE — 2580000003 HC RX 258: Performed by: STUDENT IN AN ORGANIZED HEALTH CARE EDUCATION/TRAINING PROGRAM

## 2022-07-13 PROCEDURE — 0PUG0KZ SUPPLEMENT LEFT HUMERAL SHAFT WITH NONAUTOLOGOUS TISSUE SUBSTITUTE, OPEN APPROACH: ICD-10-PCS | Performed by: ORTHOPAEDIC SURGERY

## 2022-07-13 PROCEDURE — 73070 X-RAY EXAM OF ELBOW: CPT

## 2022-07-13 PROCEDURE — 7100000001 HC PACU RECOVERY - ADDTL 15 MIN: Performed by: ORTHOPAEDIC SURGERY

## 2022-07-13 PROCEDURE — 36415 COLL VENOUS BLD VENIPUNCTURE: CPT

## 2022-07-13 PROCEDURE — 7100000000 HC PACU RECOVERY - FIRST 15 MIN: Performed by: ORTHOPAEDIC SURGERY

## 2022-07-13 PROCEDURE — 3209999900 FLUORO FOR SURGICAL PROCEDURES

## 2022-07-13 PROCEDURE — 86901 BLOOD TYPING SEROLOGIC RH(D): CPT

## 2022-07-13 PROCEDURE — 2720000010 HC SURG SUPPLY STERILE: Performed by: ORTHOPAEDIC SURGERY

## 2022-07-13 PROCEDURE — 80048 BASIC METABOLIC PNL TOTAL CA: CPT

## 2022-07-13 PROCEDURE — 6370000000 HC RX 637 (ALT 250 FOR IP): Performed by: STUDENT IN AN ORGANIZED HEALTH CARE EDUCATION/TRAINING PROGRAM

## 2022-07-13 PROCEDURE — C1769 GUIDE WIRE: HCPCS | Performed by: ORTHOPAEDIC SURGERY

## 2022-07-13 PROCEDURE — 86900 BLOOD TYPING SEROLOGIC ABO: CPT

## 2022-07-13 PROCEDURE — 6370000000 HC RX 637 (ALT 250 FOR IP)

## 2022-07-13 PROCEDURE — 2709999900 HC NON-CHARGEABLE SUPPLY: Performed by: ORTHOPAEDIC SURGERY

## 2022-07-13 PROCEDURE — 3600000005 HC SURGERY LEVEL 5 BASE: Performed by: ORTHOPAEDIC SURGERY

## 2022-07-13 PROCEDURE — 6360000002 HC RX W HCPCS: Performed by: STUDENT IN AN ORGANIZED HEALTH CARE EDUCATION/TRAINING PROGRAM

## 2022-07-13 PROCEDURE — 1200000000 HC SEMI PRIVATE

## 2022-07-13 PROCEDURE — 2580000003 HC RX 258

## 2022-07-13 PROCEDURE — 0MQ40ZZ REPAIR LEFT ELBOW BURSA AND LIGAMENT, OPEN APPROACH: ICD-10-PCS | Performed by: ORTHOPAEDIC SURGERY

## 2022-07-13 PROCEDURE — 6370000000 HC RX 637 (ALT 250 FOR IP): Performed by: CLINICAL NURSE SPECIALIST

## 2022-07-13 PROCEDURE — 3700000000 HC ANESTHESIA ATTENDED CARE: Performed by: ORTHOPAEDIC SURGERY

## 2022-07-13 PROCEDURE — 2500000003 HC RX 250 WO HCPCS

## 2022-07-13 PROCEDURE — 0PSG04Z REPOSITION LEFT HUMERAL SHAFT WITH INTERNAL FIXATION DEVICE, OPEN APPROACH: ICD-10-PCS | Performed by: ORTHOPAEDIC SURGERY

## 2022-07-13 PROCEDURE — 2580000003 HC RX 258: Performed by: ANESTHESIOLOGY

## 2022-07-13 PROCEDURE — 64415 NJX AA&/STRD BRCH PLXS IMG: CPT | Performed by: ANESTHESIOLOGY

## 2022-07-13 PROCEDURE — C1713 ANCHOR/SCREW BN/BN,TIS/BN: HCPCS | Performed by: ORTHOPAEDIC SURGERY

## 2022-07-13 DEVICE — IMPLANTABLE DEVICE: Type: IMPLANTABLE DEVICE | Site: ELBOW | Status: FUNCTIONAL

## 2022-07-13 DEVICE — GRAFT BNE SUB 15ML 0.1-4MM CANC CRUSH CHIP MORSELIZED FRZ: Type: IMPLANTABLE DEVICE | Site: ELBOW | Status: FUNCTIONAL

## 2022-07-13 DEVICE — ANCHOR SUT ORTHOCORD SZ 2 L36IN COMP BRAID CP-2 ARMED NDL: Type: IMPLANTABLE DEVICE | Site: ELBOW | Status: FUNCTIONAL

## 2022-07-13 RX ORDER — KETOROLAC TROMETHAMINE 30 MG/ML
INJECTION, SOLUTION INTRAMUSCULAR; INTRAVENOUS PRN
Status: DISCONTINUED | OUTPATIENT
Start: 2022-07-13 | End: 2022-07-13 | Stop reason: SDUPTHER

## 2022-07-13 RX ORDER — NEOSTIGMINE METHYLSULFATE 1 MG/ML
INJECTION, SOLUTION INTRAVENOUS PRN
Status: DISCONTINUED | OUTPATIENT
Start: 2022-07-13 | End: 2022-07-13 | Stop reason: SDUPTHER

## 2022-07-13 RX ORDER — SODIUM CHLORIDE 9 MG/ML
25 INJECTION, SOLUTION INTRAVENOUS PRN
Status: DISCONTINUED | OUTPATIENT
Start: 2022-07-13 | End: 2022-07-13 | Stop reason: HOSPADM

## 2022-07-13 RX ORDER — SODIUM CHLORIDE, SODIUM LACTATE, POTASSIUM CHLORIDE, CALCIUM CHLORIDE 600; 310; 30; 20 MG/100ML; MG/100ML; MG/100ML; MG/100ML
INJECTION, SOLUTION INTRAVENOUS CONTINUOUS
Status: DISCONTINUED | OUTPATIENT
Start: 2022-07-13 | End: 2022-07-14

## 2022-07-13 RX ORDER — ONDANSETRON 2 MG/ML
INJECTION INTRAMUSCULAR; INTRAVENOUS PRN
Status: DISCONTINUED | OUTPATIENT
Start: 2022-07-13 | End: 2022-07-13 | Stop reason: SDUPTHER

## 2022-07-13 RX ORDER — LABETALOL HYDROCHLORIDE 5 MG/ML
5 INJECTION, SOLUTION INTRAVENOUS
Status: DISCONTINUED | OUTPATIENT
Start: 2022-07-13 | End: 2022-07-13 | Stop reason: HOSPADM

## 2022-07-13 RX ORDER — PROPOFOL 10 MG/ML
INJECTION, EMULSION INTRAVENOUS PRN
Status: DISCONTINUED | OUTPATIENT
Start: 2022-07-13 | End: 2022-07-13 | Stop reason: SDUPTHER

## 2022-07-13 RX ORDER — GABAPENTIN 100 MG/1
100 CAPSULE ORAL 3 TIMES DAILY
Status: DISCONTINUED | OUTPATIENT
Start: 2022-07-13 | End: 2022-07-18

## 2022-07-13 RX ORDER — HYDRALAZINE HYDROCHLORIDE 20 MG/ML
5 INJECTION INTRAMUSCULAR; INTRAVENOUS
Status: DISCONTINUED | OUTPATIENT
Start: 2022-07-13 | End: 2022-07-13 | Stop reason: HOSPADM

## 2022-07-13 RX ORDER — SODIUM CHLORIDE 0.9 % (FLUSH) 0.9 %
5-40 SYRINGE (ML) INJECTION PRN
Status: DISCONTINUED | OUTPATIENT
Start: 2022-07-13 | End: 2022-07-13 | Stop reason: HOSPADM

## 2022-07-13 RX ORDER — PHENYLEPHRINE HCL IN 0.9% NACL 1 MG/10 ML
SYRINGE (ML) INTRAVENOUS PRN
Status: DISCONTINUED | OUTPATIENT
Start: 2022-07-13 | End: 2022-07-13 | Stop reason: SDUPTHER

## 2022-07-13 RX ORDER — FENTANYL CITRATE 50 UG/ML
25 INJECTION, SOLUTION INTRAMUSCULAR; INTRAVENOUS ONCE
Status: DISCONTINUED | OUTPATIENT
Start: 2022-07-13 | End: 2022-07-14

## 2022-07-13 RX ORDER — FENTANYL CITRATE 50 UG/ML
100 INJECTION, SOLUTION INTRAMUSCULAR; INTRAVENOUS ONCE
Status: DISCONTINUED | OUTPATIENT
Start: 2022-07-13 | End: 2022-07-14

## 2022-07-13 RX ORDER — ACETAMINOPHEN 325 MG/1
650 TABLET ORAL
Status: DISCONTINUED | OUTPATIENT
Start: 2022-07-13 | End: 2022-07-13 | Stop reason: HOSPADM

## 2022-07-13 RX ORDER — MIDAZOLAM HYDROCHLORIDE 2 MG/2ML
2 INJECTION, SOLUTION INTRAMUSCULAR; INTRAVENOUS EVERY 10 MIN PRN
Status: DISCONTINUED | OUTPATIENT
Start: 2022-07-13 | End: 2022-07-15

## 2022-07-13 RX ORDER — ROPIVACAINE HYDROCHLORIDE 5 MG/ML
30 INJECTION, SOLUTION EPIDURAL; INFILTRATION; PERINEURAL ONCE
Status: COMPLETED | OUTPATIENT
Start: 2022-07-13 | End: 2022-07-13

## 2022-07-13 RX ORDER — DEXAMETHASONE SODIUM PHOSPHATE 10 MG/ML
INJECTION INTRAMUSCULAR; INTRAVENOUS PRN
Status: DISCONTINUED | OUTPATIENT
Start: 2022-07-13 | End: 2022-07-13 | Stop reason: SDUPTHER

## 2022-07-13 RX ORDER — ROCURONIUM BROMIDE 10 MG/ML
INJECTION, SOLUTION INTRAVENOUS PRN
Status: DISCONTINUED | OUTPATIENT
Start: 2022-07-13 | End: 2022-07-13 | Stop reason: SDUPTHER

## 2022-07-13 RX ORDER — ONDANSETRON 2 MG/ML
4 INJECTION INTRAMUSCULAR; INTRAVENOUS
Status: DISCONTINUED | OUTPATIENT
Start: 2022-07-13 | End: 2022-07-13 | Stop reason: HOSPADM

## 2022-07-13 RX ORDER — SODIUM CHLORIDE 0.9 % (FLUSH) 0.9 %
5-40 SYRINGE (ML) INJECTION EVERY 12 HOURS SCHEDULED
Status: DISCONTINUED | OUTPATIENT
Start: 2022-07-13 | End: 2022-07-13 | Stop reason: HOSPADM

## 2022-07-13 RX ORDER — SODIUM CHLORIDE 0.9 % (FLUSH) 0.9 %
5-40 SYRINGE (ML) INJECTION EVERY 12 HOURS SCHEDULED
Status: DISCONTINUED | OUTPATIENT
Start: 2022-07-13 | End: 2022-07-18 | Stop reason: HOSPADM

## 2022-07-13 RX ORDER — SODIUM CHLORIDE 9 MG/ML
INJECTION, SOLUTION INTRAVENOUS CONTINUOUS PRN
Status: DISCONTINUED | OUTPATIENT
Start: 2022-07-13 | End: 2022-07-13 | Stop reason: SDUPTHER

## 2022-07-13 RX ORDER — ALBUTEROL SULFATE 90 UG/1
2 AEROSOL, METERED RESPIRATORY (INHALATION) EVERY 6 HOURS PRN
Status: DISCONTINUED | OUTPATIENT
Start: 2022-07-13 | End: 2022-07-18 | Stop reason: HOSPADM

## 2022-07-13 RX ORDER — SODIUM CHLORIDE 0.9 % (FLUSH) 0.9 %
5-40 SYRINGE (ML) INJECTION PRN
Status: DISCONTINUED | OUTPATIENT
Start: 2022-07-13 | End: 2022-07-18 | Stop reason: HOSPADM

## 2022-07-13 RX ORDER — IPRATROPIUM BROMIDE AND ALBUTEROL SULFATE 2.5; .5 MG/3ML; MG/3ML
1 SOLUTION RESPIRATORY (INHALATION)
Status: DISCONTINUED | OUTPATIENT
Start: 2022-07-13 | End: 2022-07-13 | Stop reason: HOSPADM

## 2022-07-13 RX ORDER — LIDOCAINE HYDROCHLORIDE 20 MG/ML
INJECTION, SOLUTION INTRAVENOUS PRN
Status: DISCONTINUED | OUTPATIENT
Start: 2022-07-13 | End: 2022-07-13 | Stop reason: SDUPTHER

## 2022-07-13 RX ORDER — MIDAZOLAM HYDROCHLORIDE 1 MG/ML
INJECTION INTRAMUSCULAR; INTRAVENOUS PRN
Status: DISCONTINUED | OUTPATIENT
Start: 2022-07-13 | End: 2022-07-13 | Stop reason: SDUPTHER

## 2022-07-13 RX ORDER — MIDAZOLAM HYDROCHLORIDE 2 MG/2ML
2 INJECTION, SOLUTION INTRAMUSCULAR; INTRAVENOUS
Status: DISCONTINUED | OUTPATIENT
Start: 2022-07-13 | End: 2022-07-13 | Stop reason: HOSPADM

## 2022-07-13 RX ORDER — FENTANYL CITRATE 50 UG/ML
INJECTION, SOLUTION INTRAMUSCULAR; INTRAVENOUS PRN
Status: DISCONTINUED | OUTPATIENT
Start: 2022-07-13 | End: 2022-07-13 | Stop reason: SDUPTHER

## 2022-07-13 RX ORDER — DROPERIDOL 2.5 MG/ML
0.62 INJECTION, SOLUTION INTRAMUSCULAR; INTRAVENOUS
Status: DISCONTINUED | OUTPATIENT
Start: 2022-07-13 | End: 2022-07-13 | Stop reason: HOSPADM

## 2022-07-13 RX ORDER — DIPHENHYDRAMINE HYDROCHLORIDE 50 MG/ML
12.5 INJECTION INTRAMUSCULAR; INTRAVENOUS
Status: DISCONTINUED | OUTPATIENT
Start: 2022-07-13 | End: 2022-07-13 | Stop reason: HOSPADM

## 2022-07-13 RX ORDER — ENOXAPARIN SODIUM 100 MG/ML
30 INJECTION SUBCUTANEOUS 2 TIMES DAILY
Status: DISCONTINUED | OUTPATIENT
Start: 2022-07-13 | End: 2022-07-18 | Stop reason: HOSPADM

## 2022-07-13 RX ORDER — TRAMADOL HYDROCHLORIDE 50 MG/1
50 TABLET ORAL
Status: DISCONTINUED | OUTPATIENT
Start: 2022-07-13 | End: 2022-07-13 | Stop reason: HOSPADM

## 2022-07-13 RX ORDER — OXYCODONE HYDROCHLORIDE 5 MG/1
5 TABLET ORAL EVERY 6 HOURS PRN
Qty: 28 TABLET | Refills: 0 | Status: SHIPPED | OUTPATIENT
Start: 2022-07-13 | End: 2022-07-20

## 2022-07-13 RX ORDER — GABAPENTIN 100 MG/1
100 CAPSULE ORAL 3 TIMES DAILY
Status: CANCELLED | OUTPATIENT
Start: 2022-07-13

## 2022-07-13 RX ORDER — ROPIVACAINE HYDROCHLORIDE 5 MG/ML
INJECTION, SOLUTION EPIDURAL; INFILTRATION; PERINEURAL
Status: COMPLETED
Start: 2022-07-13 | End: 2022-07-13

## 2022-07-13 RX ORDER — HYDROMORPHONE HCL 110MG/55ML
PATIENT CONTROLLED ANALGESIA SYRINGE INTRAVENOUS PRN
Status: DISCONTINUED | OUTPATIENT
Start: 2022-07-13 | End: 2022-07-13 | Stop reason: SDUPTHER

## 2022-07-13 RX ORDER — GLYCOPYRROLATE 0.2 MG/ML
INJECTION INTRAMUSCULAR; INTRAVENOUS PRN
Status: DISCONTINUED | OUTPATIENT
Start: 2022-07-13 | End: 2022-07-13 | Stop reason: SDUPTHER

## 2022-07-13 RX ORDER — SODIUM CHLORIDE 9 MG/ML
INJECTION, SOLUTION INTRAVENOUS PRN
Status: DISCONTINUED | OUTPATIENT
Start: 2022-07-13 | End: 2022-07-18 | Stop reason: HOSPADM

## 2022-07-13 RX ADMIN — Medication 2 MG: at 13:59

## 2022-07-13 RX ADMIN — KETOROLAC TROMETHAMINE 30 MG: 30 INJECTION, SOLUTION INTRAMUSCULAR at 13:59

## 2022-07-13 RX ADMIN — Medication 100 MCG: at 13:05

## 2022-07-13 RX ADMIN — CEFAZOLIN 1000 MG: 1 INJECTION, POWDER, FOR SOLUTION INTRAMUSCULAR; INTRAVENOUS at 22:07

## 2022-07-13 RX ADMIN — SODIUM CHLORIDE, PRESERVATIVE FREE 10 ML: 5 INJECTION INTRAVENOUS at 04:18

## 2022-07-13 RX ADMIN — ROCURONIUM BROMIDE 40 MG: 10 INJECTION, SOLUTION INTRAVENOUS at 12:33

## 2022-07-13 RX ADMIN — SODIUM CHLORIDE, PRESERVATIVE FREE 10 ML: 5 INJECTION INTRAVENOUS at 07:53

## 2022-07-13 RX ADMIN — HYDROMORPHONE HYDROCHLORIDE 0.5 MG: 1 INJECTION, SOLUTION INTRAMUSCULAR; INTRAVENOUS; SUBCUTANEOUS at 04:17

## 2022-07-13 RX ADMIN — OXYCODONE HYDROCHLORIDE 10 MG: 10 TABLET ORAL at 05:36

## 2022-07-13 RX ADMIN — ENOXAPARIN SODIUM 30 MG: 100 INJECTION SUBCUTANEOUS at 22:07

## 2022-07-13 RX ADMIN — Medication 100 MCG: at 13:31

## 2022-07-13 RX ADMIN — FENTANYL CITRATE 100 MCG: 50 INJECTION, SOLUTION INTRAMUSCULAR; INTRAVENOUS at 12:32

## 2022-07-13 RX ADMIN — LIDOCAINE HYDROCHLORIDE 100 MG: 20 INJECTION, SOLUTION INTRAVENOUS at 12:32

## 2022-07-13 RX ADMIN — OXYCODONE HYDROCHLORIDE 10 MG: 10 TABLET ORAL at 22:06

## 2022-07-13 RX ADMIN — HYDROMORPHONE HYDROCHLORIDE 1 MG: 2 INJECTION, SOLUTION INTRAMUSCULAR; INTRAVENOUS; SUBCUTANEOUS at 14:29

## 2022-07-13 RX ADMIN — OXYCODONE HYDROCHLORIDE 10 MG: 10 TABLET ORAL at 01:06

## 2022-07-13 RX ADMIN — SODIUM CHLORIDE, PRESERVATIVE FREE 10 ML: 5 INJECTION INTRAVENOUS at 22:07

## 2022-07-13 RX ADMIN — OXYCODONE HYDROCHLORIDE 10 MG: 10 TABLET ORAL at 17:59

## 2022-07-13 RX ADMIN — HYDROMORPHONE HYDROCHLORIDE 1 MG: 2 INJECTION, SOLUTION INTRAMUSCULAR; INTRAVENOUS; SUBCUTANEOUS at 14:26

## 2022-07-13 RX ADMIN — METHOCARBAMOL 1000 MG: 500 TABLET ORAL at 17:03

## 2022-07-13 RX ADMIN — FENTANYL CITRATE 50 MCG: 50 INJECTION, SOLUTION INTRAMUSCULAR; INTRAVENOUS at 11:37

## 2022-07-13 RX ADMIN — SENNOSIDES AND DOCUSATE SODIUM 1 TABLET: 50; 8.6 TABLET ORAL at 20:02

## 2022-07-13 RX ADMIN — OXYCODONE HYDROCHLORIDE 10 MG: 10 TABLET ORAL at 09:39

## 2022-07-13 RX ADMIN — PROPOFOL 150 MG: 10 INJECTION, EMULSION INTRAVENOUS at 12:33

## 2022-07-13 RX ADMIN — FENTANYL CITRATE 50 MCG: 50 INJECTION, SOLUTION INTRAMUSCULAR; INTRAVENOUS at 13:00

## 2022-07-13 RX ADMIN — SODIUM CHLORIDE, POTASSIUM CHLORIDE, SODIUM LACTATE AND CALCIUM CHLORIDE: 600; 310; 30; 20 INJECTION, SOLUTION INTRAVENOUS at 01:05

## 2022-07-13 RX ADMIN — FENTANYL CITRATE 50 MCG: 50 INJECTION, SOLUTION INTRAMUSCULAR; INTRAVENOUS at 12:52

## 2022-07-13 RX ADMIN — ONDANSETRON HYDROCHLORIDE 4 MG: 2 SOLUTION INTRAMUSCULAR; INTRAVENOUS at 13:58

## 2022-07-13 RX ADMIN — SODIUM CHLORIDE, PRESERVATIVE FREE 10 ML: 5 INJECTION INTRAVENOUS at 20:03

## 2022-07-13 RX ADMIN — GLYCOPYRROLATE 0.4 MG: 0.2 INJECTION, SOLUTION INTRAMUSCULAR; INTRAVENOUS at 13:59

## 2022-07-13 RX ADMIN — GABAPENTIN 100 MG: 100 CAPSULE ORAL at 20:02

## 2022-07-13 RX ADMIN — HYDROMORPHONE HYDROCHLORIDE 0.5 MG: 1 INJECTION, SOLUTION INTRAMUSCULAR; INTRAVENOUS; SUBCUTANEOUS at 07:53

## 2022-07-13 RX ADMIN — METHOCARBAMOL 1000 MG: 500 TABLET ORAL at 20:02

## 2022-07-13 RX ADMIN — SODIUM CHLORIDE: 9 INJECTION, SOLUTION INTRAVENOUS at 12:26

## 2022-07-13 RX ADMIN — ROPIVACAINE HYDROCHLORIDE 30 ML: 5 INJECTION, SOLUTION EPIDURAL; INFILTRATION; PERINEURAL at 11:42

## 2022-07-13 RX ADMIN — CEFAZOLIN 2000 MG: 2 INJECTION, POWDER, FOR SOLUTION INTRAMUSCULAR; INTRAVENOUS at 12:34

## 2022-07-13 RX ADMIN — ACETAMINOPHEN 325MG 650 MG: 325 TABLET ORAL at 20:01

## 2022-07-13 RX ADMIN — DEXAMETHASONE SODIUM PHOSPHATE 10 MG: 10 INJECTION INTRAMUSCULAR; INTRAVENOUS at 12:52

## 2022-07-13 RX ADMIN — MIDAZOLAM 2 MG: 1 INJECTION INTRAMUSCULAR; INTRAVENOUS at 11:37

## 2022-07-13 RX ADMIN — Medication 100 MCG: at 13:24

## 2022-07-13 ASSESSMENT — PAIN - FUNCTIONAL ASSESSMENT
PAIN_FUNCTIONAL_ASSESSMENT: PREVENTS OR INTERFERES SOME ACTIVE ACTIVITIES AND ADLS

## 2022-07-13 ASSESSMENT — PAIN DESCRIPTION - ORIENTATION
ORIENTATION: LEFT

## 2022-07-13 ASSESSMENT — PAIN SCALES - GENERAL
PAINLEVEL_OUTOF10: 5
PAINLEVEL_OUTOF10: 7
PAINLEVEL_OUTOF10: 9
PAINLEVEL_OUTOF10: 6
PAINLEVEL_OUTOF10: 8
PAINLEVEL_OUTOF10: 7
PAINLEVEL_OUTOF10: 9
PAINLEVEL_OUTOF10: 7

## 2022-07-13 ASSESSMENT — PAIN DESCRIPTION - DESCRIPTORS
DESCRIPTORS: ACHING;DISCOMFORT;SORE
DESCRIPTORS: ACHING;DISCOMFORT;SORE
DESCRIPTORS: ACHING;DISCOMFORT;NAGGING
DESCRIPTORS: ACHING;DISCOMFORT;SORE
DESCRIPTORS: ACHING;DISCOMFORT;SORE
DESCRIPTORS: ACHING;NAGGING;DISCOMFORT
DESCRIPTORS: DISCOMFORT;THROBBING;SORE
DESCRIPTORS: ACHING;POUNDING;DISCOMFORT

## 2022-07-13 ASSESSMENT — PAIN DESCRIPTION - FREQUENCY
FREQUENCY: INTERMITTENT
FREQUENCY: INTERMITTENT
FREQUENCY: CONTINUOUS

## 2022-07-13 ASSESSMENT — PAIN DESCRIPTION - LOCATION
LOCATION: HIP
LOCATION: HIP;ELBOW
LOCATION: HIP

## 2022-07-13 ASSESSMENT — PAIN DESCRIPTION - PAIN TYPE
TYPE: ACUTE PAIN

## 2022-07-13 ASSESSMENT — PAIN DESCRIPTION - ONSET
ONSET: ON-GOING
ONSET: GRADUAL
ONSET: GRADUAL

## 2022-07-13 ASSESSMENT — LIFESTYLE VARIABLES: SMOKING_STATUS: 0
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
## 2022-07-13 NOTE — ANESTHESIA PROCEDURE NOTES
Peripheral Block    Patient location during procedure: pre-op  Reason for block: post-op pain management  Start time: 7/13/2022 11:36 AM  End time: 7/13/2022 11:46 AM  Staffing  Performed: other anesthesia staff   Anesthesiologist: Saintclair Saddler, MD  Other anesthesia staff: Peyton Davis RN  Preanesthetic Checklist  Completed: patient identified, IV checked, site marked, risks and benefits discussed, surgical/procedural consents, equipment checked, pre-op evaluation, timeout performed, anesthesia consent given, oxygen available and monitors applied/VS acknowledged  Peripheral Block   Patient position: supine  Prep: ChloraPrep  Provider prep: sterile gloves and mask  Patient monitoring: cardiac monitor, continuous pulse ox, continuous capnometry, frequent blood pressure checks and IV access  Block type: Brachial plexus  Supraclavicular  Laterality: left  Injection technique: single-shot  Guidance: ultrasound guided    Needle   Needle type: combined needle/nerve stimulator   Needle gauge: 22 G  Needle localization: ultrasound guidance  Needle length: 2 inch. Assessment   Injection assessment: negative aspiration for heme, no paresthesia on injection, local visualized surrounding nerve on ultrasound and no intravascular symptoms  Paresthesia pain: none  Slow fractionated injection: yes  Hemodynamics: stable  Real-time US image taken/store: yes  Outcomes: patient tolerated procedure well and uncomplicated    Additional Notes  Tolerated procedure well, VSS.

## 2022-07-13 NOTE — PROGRESS NOTES
OT consult received and appreciated. Chart reviewed. Will hold evaluation due to ORIF of L elbow scheduled this am . Will evaluate at a later time post op as indicated. Thank you. Sherman Dawson.  [address], [address]
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
## 2022-07-13 NOTE — CARE COORDINATION
Social Work Discharge Planning:  ORIF of L elbow scheduled today. Patient agreeable to [facility] will need post op therapy notes to assist with transition of care. SW following.   Electronically signed by FRED Taylor on 7/13/2022 at 10:37 AM

## 2022-07-13 NOTE — BRIEF OP NOTE
Brief Postoperative Note      Patient: Kate Donis  YOB: 1980  MRN: [id number]    Date of Procedure: 7/13/2022    Pre-Op Diagnosis: Left sided capitellum fracture    Post-Op Diagnosis: Same       Procedure(s):  OPEN REDUCTION INTERNAL FIXATION LEFT ELBOW WITH LIGAMENTUS REPAIR     Surgeon(s):  Patience Walker MD    Assistant:  Resident: Dominga Wheatley DO    Anesthesia: General    Estimated Blood Loss (mL): less than 740     Complications: None    Specimens:   * No specimens in log *    Implants:  Implant Name Type Inv. Item Serial No.  Lot No. LRB No. Used Action   ANCHOR SUT ORTHOCORD SZ 2 L36IN COMP BRAID CP-2 ARMED NDL - [id number]  ANCHOR SUT ORTHOCORD SZ 2 L36IN COMP BRAID CP-2 ARMED NDL  [id number] DEPU[employer] SYNTHES MITE- [id number] Left 1 Implanted   SCREW BNE LNG THRD 2.5X32 MM 13 MM ARELIS HDLSS TI - [id number]  SCREW BNE LNG THRD 2.5X32 MM 13 MM ARELIS HDLSS TI  [facility] [facility]  Left 1 Implanted   SCREW BNE LNG THRD 2.5X30 MM 12 MM ARELIS HDLSS TI - [id number]  SCREW BNE LNG THRD 2.5X30 MM 12 MM ARELIS HDLSS TI  [employer]  Left 1 Implanted   GRAFT BNE SUB 15ML 0.1-4MM CANC CRUSH CHIP MORSELIZED FRZ - [id number]  GRAFT BNE SUB 15ML 0.1-4MM CANC CRUSH CHIP MORSELIZED FRZ [id number] MUSCULOSKELETAL TRANSPLANT [facility]  Left 1 Implanted   SCREW BNE LNG THRD 2X20 MM 8 MM ARELIS HDLSS TI - [id number]  SCREW BNE LNG THRD 2X20 MM 8 MM ARELIS HDLSS TI  [employer] [facility]  Left 1 Implanted         Drains:   Urinary Catheter (Active)   Catheter Indications Perioperative use for selected surgical procedures 07/13/22 1006   Urine Color Irene 07/13/22 1006   Urine Appearance Clear 07/13/22 1006   Collection Container Standard 07/13/22 0537   Securement Method Securing device (Describe) 07/13/22 0537   Catheter Best Practices  Drainage tube clipped to bed;Catheter secured to thigh; Tamper seal intact; Bag below bladder;Bag not on floor;Drainage bag less than half full 07/13/22 1642
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
## 2022-07-13 NOTE — PROGRESS NOTES
Physical Therapy    PT order received, chart reviewed and PT evaluation held this date. Pt scheduled for ORIF of L elbow this date. Will re-attempt evaluation at a later time. Thank you for the opportunity to assist in the care of this patient.   Shirin Bautista, PT, DPT  License PT [id number]

## 2022-07-14 LAB
BASOPHILS ABSOLUTE: 0.02 E9/L (ref 0–0.2)
BASOPHILS RELATIVE PERCENT: 0.2 % (ref 0–2)
EOSINOPHILS ABSOLUTE: 0.01 E9/L (ref 0.05–0.5)
EOSINOPHILS RELATIVE PERCENT: 0.1 % (ref 0–6)
HCT VFR BLD CALC: 34.2 % (ref 34–48)
HEMOGLOBIN: 11.2 G/DL (ref 11.5–15.5)
IMMATURE GRANULOCYTES #: 0.03 E9/L
IMMATURE GRANULOCYTES %: 0.3 % (ref 0–5)
LYMPHOCYTES ABSOLUTE: 1.05 E9/L (ref 1.5–4)
LYMPHOCYTES RELATIVE PERCENT: 11.2 % (ref 20–42)
MCH RBC QN AUTO: 30.5 PG (ref 26–35)
MCHC RBC AUTO-ENTMCNC: 32.7 % (ref 32–34.5)
MCV RBC AUTO: 93.2 FL (ref 80–99.9)
MONOCYTES ABSOLUTE: 0.65 E9/L (ref 0.1–0.95)
MONOCYTES RELATIVE PERCENT: 6.9 % (ref 2–12)
NEUTROPHILS ABSOLUTE: 7.61 E9/L (ref 1.8–7.3)
NEUTROPHILS RELATIVE PERCENT: 81.3 % (ref 43–80)
PDW BLD-RTO: 13.2 FL (ref 11.5–15)
PLATELET # BLD: 177 E9/L (ref 130–450)
PMV BLD AUTO: 10.4 FL (ref 7–12)
RBC # BLD: 3.67 E12/L (ref 3.5–5.5)
WBC # BLD: 9.4 E9/L (ref 4.5–11.5)

## 2022-07-14 PROCEDURE — 97161 PT EVAL LOW COMPLEX 20 MIN: CPT

## 2022-07-14 PROCEDURE — 1200000000 HC SEMI PRIVATE

## 2022-07-14 PROCEDURE — 6360000002 HC RX W HCPCS: Performed by: STUDENT IN AN ORGANIZED HEALTH CARE EDUCATION/TRAINING PROGRAM

## 2022-07-14 PROCEDURE — 6370000000 HC RX 637 (ALT 250 FOR IP): Performed by: CLINICAL NURSE SPECIALIST

## 2022-07-14 PROCEDURE — 97165 OT EVAL LOW COMPLEX 30 MIN: CPT

## 2022-07-14 PROCEDURE — 36415 COLL VENOUS BLD VENIPUNCTURE: CPT

## 2022-07-14 PROCEDURE — 6360000002 HC RX W HCPCS

## 2022-07-14 PROCEDURE — 2580000003 HC RX 258: Performed by: ANESTHESIOLOGY

## 2022-07-14 PROCEDURE — 85025 COMPLETE CBC W/AUTO DIFF WBC: CPT

## 2022-07-14 PROCEDURE — 6370000000 HC RX 637 (ALT 250 FOR IP)

## 2022-07-14 PROCEDURE — 97535 SELF CARE MNGMENT TRAINING: CPT

## 2022-07-14 PROCEDURE — 99232 SBSQ HOSP IP/OBS MODERATE 35: CPT | Performed by: SURGERY

## 2022-07-14 PROCEDURE — 97530 THERAPEUTIC ACTIVITIES: CPT

## 2022-07-14 PROCEDURE — 6370000000 HC RX 637 (ALT 250 FOR IP): Performed by: STUDENT IN AN ORGANIZED HEALTH CARE EDUCATION/TRAINING PROGRAM

## 2022-07-14 PROCEDURE — 2580000003 HC RX 258

## 2022-07-14 PROCEDURE — 2580000003 HC RX 258: Performed by: STUDENT IN AN ORGANIZED HEALTH CARE EDUCATION/TRAINING PROGRAM

## 2022-07-14 RX ADMIN — OXYCODONE HYDROCHLORIDE 10 MG: 10 TABLET ORAL at 06:10

## 2022-07-14 RX ADMIN — ENOXAPARIN SODIUM 30 MG: 100 INJECTION SUBCUTANEOUS at 08:03

## 2022-07-14 RX ADMIN — HYDROMORPHONE HYDROCHLORIDE 0.5 MG: 1 INJECTION, SOLUTION INTRAMUSCULAR; INTRAVENOUS; SUBCUTANEOUS at 08:05

## 2022-07-14 RX ADMIN — METHOCARBAMOL 1000 MG: 500 TABLET ORAL at 12:38

## 2022-07-14 RX ADMIN — SODIUM CHLORIDE, PRESERVATIVE FREE 10 ML: 5 INJECTION INTRAVENOUS at 06:11

## 2022-07-14 RX ADMIN — OXYCODONE HYDROCHLORIDE 10 MG: 10 TABLET ORAL at 14:45

## 2022-07-14 RX ADMIN — METHOCARBAMOL 1000 MG: 500 TABLET ORAL at 08:04

## 2022-07-14 RX ADMIN — CEFAZOLIN 1000 MG: 1 INJECTION, POWDER, FOR SOLUTION INTRAMUSCULAR; INTRAVENOUS at 06:11

## 2022-07-14 RX ADMIN — SENNOSIDES AND DOCUSATE SODIUM 1 TABLET: 50; 8.6 TABLET ORAL at 08:04

## 2022-07-14 RX ADMIN — SODIUM CHLORIDE, PRESERVATIVE FREE 10 ML: 5 INJECTION INTRAVENOUS at 21:33

## 2022-07-14 RX ADMIN — ACETAMINOPHEN 325MG 650 MG: 325 TABLET ORAL at 16:58

## 2022-07-14 RX ADMIN — SENNOSIDES AND DOCUSATE SODIUM 1 TABLET: 50; 8.6 TABLET ORAL at 19:52

## 2022-07-14 RX ADMIN — POLYETHYLENE GLYCOL 3350 17 G: 17 POWDER, FOR SOLUTION ORAL at 08:03

## 2022-07-14 RX ADMIN — ACETAMINOPHEN 325MG 650 MG: 325 TABLET ORAL at 21:32

## 2022-07-14 RX ADMIN — SODIUM CHLORIDE, PRESERVATIVE FREE 10 ML: 5 INJECTION INTRAVENOUS at 01:28

## 2022-07-14 RX ADMIN — SODIUM CHLORIDE, PRESERVATIVE FREE 10 ML: 5 INJECTION INTRAVENOUS at 08:03

## 2022-07-14 RX ADMIN — SODIUM CHLORIDE, PRESERVATIVE FREE 10 ML: 5 INJECTION INTRAVENOUS at 08:09

## 2022-07-14 RX ADMIN — HYDROMORPHONE HYDROCHLORIDE 0.5 MG: 1 INJECTION, SOLUTION INTRAMUSCULAR; INTRAVENOUS; SUBCUTANEOUS at 11:38

## 2022-07-14 RX ADMIN — OXYCODONE HYDROCHLORIDE 10 MG: 10 TABLET ORAL at 02:24

## 2022-07-14 RX ADMIN — HYDROMORPHONE HYDROCHLORIDE 0.5 MG: 1 INJECTION, SOLUTION INTRAMUSCULAR; INTRAVENOUS; SUBCUTANEOUS at 16:59

## 2022-07-14 RX ADMIN — HYDROMORPHONE HYDROCHLORIDE 0.5 MG: 1 INJECTION, SOLUTION INTRAMUSCULAR; INTRAVENOUS; SUBCUTANEOUS at 21:32

## 2022-07-14 RX ADMIN — OXYCODONE HYDROCHLORIDE 10 MG: 10 TABLET ORAL at 19:53

## 2022-07-14 RX ADMIN — HYDROMORPHONE HYDROCHLORIDE 0.5 MG: 1 INJECTION, SOLUTION INTRAMUSCULAR; INTRAVENOUS; SUBCUTANEOUS at 04:31

## 2022-07-14 RX ADMIN — ENOXAPARIN SODIUM 30 MG: 100 INJECTION SUBCUTANEOUS at 19:53

## 2022-07-14 RX ADMIN — HYDROMORPHONE HYDROCHLORIDE 0.5 MG: 1 INJECTION, SOLUTION INTRAMUSCULAR; INTRAVENOUS; SUBCUTANEOUS at 01:28

## 2022-07-14 RX ADMIN — OXYCODONE HYDROCHLORIDE 10 MG: 10 TABLET ORAL at 10:32

## 2022-07-14 RX ADMIN — METHOCARBAMOL 1000 MG: 500 TABLET ORAL at 16:58

## 2022-07-14 RX ADMIN — SODIUM CHLORIDE, PRESERVATIVE FREE 10 ML: 5 INJECTION INTRAVENOUS at 04:32

## 2022-07-14 RX ADMIN — GABAPENTIN 100 MG: 100 CAPSULE ORAL at 19:52

## 2022-07-14 RX ADMIN — GABAPENTIN 100 MG: 100 CAPSULE ORAL at 08:04

## 2022-07-14 RX ADMIN — ACETAMINOPHEN 325MG 650 MG: 325 TABLET ORAL at 08:03

## 2022-07-14 RX ADMIN — SODIUM CHLORIDE, PRESERVATIVE FREE 10 ML: 5 INJECTION INTRAVENOUS at 19:56

## 2022-07-14 RX ADMIN — METHOCARBAMOL 1000 MG: 500 TABLET ORAL at 19:53

## 2022-07-14 RX ADMIN — GABAPENTIN 100 MG: 100 CAPSULE ORAL at 12:38

## 2022-07-14 ASSESSMENT — PAIN DESCRIPTION - ORIENTATION
ORIENTATION: LEFT

## 2022-07-14 ASSESSMENT — PAIN DESCRIPTION - PAIN TYPE
TYPE: SURGICAL PAIN;ACUTE PAIN
TYPE: SURGICAL PAIN;ACUTE PAIN
TYPE: SURGICAL PAIN
TYPE: SURGICAL PAIN;ACUTE PAIN
TYPE: ACUTE PAIN;SURGICAL PAIN
TYPE: ACUTE PAIN;SURGICAL PAIN
TYPE: SURGICAL PAIN

## 2022-07-14 ASSESSMENT — PAIN SCALES - GENERAL
PAINLEVEL_OUTOF10: 9
PAINLEVEL_OUTOF10: 10
PAINLEVEL_OUTOF10: 9
PAINLEVEL_OUTOF10: 10
PAINLEVEL_OUTOF10: 8
PAINLEVEL_OUTOF10: 10
PAINLEVEL_OUTOF10: 8
PAINLEVEL_OUTOF10: 10
PAINLEVEL_OUTOF10: 8
PAINLEVEL_OUTOF10: 9
PAINLEVEL_OUTOF10: 8
PAINLEVEL_OUTOF10: 10
PAINLEVEL_OUTOF10: 4

## 2022-07-14 ASSESSMENT — PAIN DESCRIPTION - LOCATION
LOCATION: ELBOW
LOCATION: ARM
LOCATION: ELBOW

## 2022-07-14 ASSESSMENT — PAIN DESCRIPTION - DESCRIPTORS
DESCRIPTORS: ACHING;DISCOMFORT;SORE
DESCRIPTORS: ACHING;DISCOMFORT;SHARP
DESCRIPTORS: ACHING;DISCOMFORT;SORE

## 2022-07-14 ASSESSMENT — PAIN DESCRIPTION - ONSET
ONSET: ON-GOING
ONSET: GRADUAL

## 2022-07-14 ASSESSMENT — PAIN - FUNCTIONAL ASSESSMENT
PAIN_FUNCTIONAL_ASSESSMENT: PREVENTS OR INTERFERES SOME ACTIVE ACTIVITIES AND ADLS

## 2022-07-14 ASSESSMENT — PAIN DESCRIPTION - FREQUENCY
FREQUENCY: CONTINUOUS
FREQUENCY: INTERMITTENT
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
## 2022-07-14 NOTE — DISCHARGE INSTRUCTIONS
TRAUMA SERVICES DISCHARGE INSTRUCTIONS    Call [phone], option 2, for any questions/concerns. Please follow the instructions checked below:    During the course of your workup, we identified an incidental finding of: Thyroid nodule  Please follow-up with your primary care provider. ACTIVITY INSTRUCTIONS  Increase activity as tolerated  No heavy lifting or strenuous activity  Take your incentive spirometer home and use 4-6 times/day   [x]  No driving until cleared by Leslee Diehl ([id number] [facility] surgery). WOUND/DRESSING INSTRUCTIONS:  You may shower. No sitting in bath tub, hot tub or swimming until cleared by physician. Ice to areas of pain for first 24 hours. Heat to areas of pain after that. Wash areas of lacerations/abrasions with soap & water. Rinse well. Pat dry with clean towel. Apply thin layer of Bacitracin, Neosporin, or triple antibiotic cream to affected area 2-3 times per day. Keep wounds clean and dry. []  Sutures/Staples are to be removed    MEDICATION INSTRUCTIONS  Take medication as prescribed. When taking pain medications, you may experience dizziness or drowsiness. Do not drink alcohol or drive when taking these medications. You may experience constipation while taking pain medication. You may take over the counter stool softeners such as docusate (Colace), sennosides S (Senokot-S), or Miralax.   []  You may take Ibuprofen (over the counter) as directed for mild pain. --You may take up to 800mg every 8 hours for pain, please take with food or milk.   []  You may take acetaminophen (Tylenol) products. Do NOT take more than 4000mg of Tylenol in 24h. []  Do not take any other acetaminophen (Tylenol) products if you are taking Percocet or Norco, as these contain Tylenol. --Do NOT take more than 4000mg of Tylenol in 24h. OPIOID MEDICATION INSTRUCTIONS  Read the medication guide that is included with your prescription.   Take your medication exactly as prescribed. Store medication away from children and in a safe place. Do NOT share your medication with others. Do NOT take medication unless it is prescribed for you. Do NOT drink alcohol while taking opioids (I.e., Norco, Percocet, Oxycodone, etc). Discuss with the Trauma Clinic staff if the dose of medication you are taking does not control your pain and any side effects that you may be having. CALL 911 OR YOUR LOCAL EMERGENCY SERVICE:  --If you take too much medication  --If you have trouble breathing or shortness of breath  --A child has taken this medication. WORK:  You may not return to work until you receive follow-up with the Trauma Clinic or clearance by all consultants. Call the trauma clinic for any of the following or for questions/concerns;  --fever over 101F  --redness, swelling, hardness or warmth at the wound site(s). --Unrelieved nausea/vomiting  --Foul smelling or cloudy drainage at the wound site(s)  --Unrelieved pain or increase in pain  --Increase in shortness of breath    Follow-up:  Trauma Clinic: [phone] option 400 [facility]    Orthopedic Hand Surgery Discharge Instructions    ACTIVITY INSTRUCTIONS:    Elevate extremity to help reduce swelling. Use Purple Pillow for elevation of hand/arm   Use sling as needed. Non-weight bearing to the left upper extremity. WOUND/DRESSING INSTRUCTIONS:  Always ensure you and your care giver clean hands before and after caring for the wound. Keep the dressing, cast, or splint clean and dry until seen in office. Do not remove dressing   OK to shower- Keep your dressing dry. Can ice surgical region to help reduce swelling, Do not apply ice to fingers or toes       MEDICATION INSTRUCTIONS:  Take pain medicine as directed. When taking pain medications, you may experience dizziness or drowsiness.    Do not drink alcohol or drive when taking these medications. Do not take any other medication containing acetaminophen (Tylenol) while taking the prescribed pain medication. Ibuprofen, Aleve, Motrin, or Naproxen are okay but should not be taken on an empty stomach. *Watch for these significant complications:  Call physician if these or any other problems occur:  Fever over 101°, redness, swelling or warmth at the operative site  Unrelieved nausea    Foul smelling or cloudy drainage at the operative site   Unrelieved pain  Breathing issues such as shortness of breath or difficulty breathing    Blood soaked dressing. (Some oozing may be normal)     Numb, pale, blue, cold or tingling extremity       Make an appointment to be seen 8-10 days after surgery  FOLLOW-UP CARE:    Dr. Baron Boland. [address]  [facility], [place]  [phone]          [address]    Call [phone], option 2, for any questions/concerns and for follow-up appointment in 2 week(s). Please follow the instructions checked below:    During the course of your workup, we identified an incidental finding of:  thyroid nodule  Please follow-up with your primary care provider. ACTIVITY INSTRUCTIONS  Increase activity as tolerated  No heavy lifting or strenuous activity  Take your incentive spirometer home and use 4-6 times/day   [x]  No driving until cleared by orthopedics    WOUND/DRESSING INSTRUCTIONS:  You may shower. No sitting in bath tub, hot tub or swimming until cleared by physician. Ice to areas of pain for first 24 hours. Heat to areas of pain after that. Wash areas of lacerations/abrasions with soap & water. Rinse well. Pat dry with clean towel. Apply thin layer of Bacitracin, Neosporin, or triple antibiotic cream to affected area 2-3 times per day. Keep wounds clean and dry. []  Sutures/Staples are to be removed     MEDICATION INSTRUCTIONS  Take medication as prescribed.   When taking pain medications, you may experience dizziness [address] [place], [place]  [address]      MILD TRAUMATIC BRAIN INJURY OR CONCUSSION  A mild traumatic brain injury (TBI) is one that causes some degree of injury to the brain causing symptoms ranging from a brief period of confusion to loss of consciousness (being knocked out). There is no major bruising or bleeding in the brain but symptoms can last from hours to months depending on the severity of the injury. Family or friends need to observe any change in behavior for the next 48 hours. Delayed effects from head injury do occur occasionally and can be due to slow bleeding or swelling around the surface of the brain. These effects may occur even if the x-rays/CT scans were normal.  Please observe the following symptoms during the next 24-48 hours. CALL 911 if:  Pupils (black part in the center of the eye) are unequal in size, and this is new. Seizure (convulsion). Not responding to others/won't wake up or very hard to wake up  Faints (passes out)  Vomiting more than 3 times    Notify the TRAUMA CLINIC if any of the following symptoms occur:  Severe headache -- Mild headache may last for days. Report worsening pain or uncontrolled pain with prescribed medicine. Numbness, tingling or weakness -- Present in arms or legs; unsteady walking. Eye Changes/light sensation -- Vision problems; blurred or double-vision; unequal sized pupils. Nausea/Vomiting -- Episodes of vomiting may occur initially after a head injury. Persistent vomiting or difficulty taking medication by mouth. Increased Sleepiness -- Difficulty waking from sleep with increased confusion. Dizziness -- Does not go away or occurs repeatedly. Vomiting may accompany dizziness. Drainage -- Clear fluid or blood from the nose and ears. Fever -- Temperature over 101 degrees. Neck Pain. The First Four Weeks  The symptoms below are common after a mild brain injury.  They usually get better on their own within a few weeks: feeling tired or ?low  problems falling or staying asleep  feeling confused, poor concentration, or slow to answer questions  feeling dizzy, poor balance, or poor coordination  being sensitive to light  being sensitive to sounds  ringing in the ears  a mild headache, sometimes with nausea and/or vomiting  being irritable, having mood swings, or feeling somewhat sad or down  Contact the TRAUMA CLINIC if your symptoms are affecting your everyday activities. Remember that letting yourself get too tired can make your symptoms worse. Listen to your body: if doing a certain activity increases your symptoms, take a break from that activity. Build up the amount of time you do an activity and stay under the threshold of symptoms. Long term Effects (Post-Concussive Syndrome)    Notify physician if any of the following persists longer than 2-4 weeks. Difficulty with concentration or attention (easily distracted)  Frequent headaches  Memory problems   Sensitivity to light   Sleeping difficulties      There is a higher risk of having a more serious head injury if:  Previous history of head injury or concussion  Taking medicine that thins your blood, or have a bleeding disorder  Have other neurologic (brain) problems  Have difficulty walking or frequent falls  Active in high impact contact sports, like soccer or football. Activities  Stay away from activities that could cause another head injury (like sports), until the doctor says it's okay. A second blow to the head can cause more damage to the brain  Limit reading, television, video games, etc. the first 48 hours. Your brain needs to rest so that it can recover. You may find that it helps to take time off school or work.    Limit exposure to bright lights, loud noises, and crowds for the first 48 hours, as these can make your symptoms worse  Limit use of screens, such as an IPad, computer, cellphone, TV, etc, as these can make symptoms, especially headaches, worse.      Work/School  It is recommended that you wait to return to work/school until after your follow-up appointment with trauma or your family doctor. If you are having no symptoms, please call for an earlier appointment  Some people find it hard to concentrate well so return to your normal activities slowly. Go back to work or school for half days at first, and increase as tolerated. Trauma services can help you with a graduated schedule. If you feel comfortable doing so, tell work or school about your concussion. You may have to adjust your activities, depending on your job or school demands. Rest and Sleep  Rest for the first 24 hours; it's one of the best things to help your brain recover. It's okay to sleep if you want  You don't have to be woken up every few hours. If someone does wake you up, you should awaken easily. Do some light physical activity (housework) or light exercise (walking, stationary bike) as soon as you can tolerate the movement. Strenuous exercise (such as jogging or weight lifting) can make your concussion symptoms worse or last longer. Diet  Return to a normal diet as tolerated, you may want to start with liquids first  Eat healthy meals (including breakfast) and snacks throughout the day as your brain heals. Managing Pain  Tylenol or Ibuprofen are the best meds to take for headaches. Your doctor may have prescribed you medications if your headaches were severe or you have other injuries. Please take as directed. Driving  Your ability to concentrate and react quickly might be affected by the concussion. Please contact trauma services for advice on when to resume driving. Do not drive if you're concerned about vision problems, slowed thinking, slowed reaction time, reduced attention or poor judgment. Wear sunglasses even during winter if monika while driving. The bright light may induce a headache.      Alcohol use/Drug use  Don't drink alcohol or use

## 2022-07-14 NOTE — OP NOTE
[address]                  [address] [facility], [address] [place] [address]                                OPERATIVE REPORT    PATIENT NAME: Nelson Stone                       :        1980  MED REC NO:   [id number]                            ROOM:       5419  ACCOUNT NO:   [de-identified]                           ADMIT DATE: 2022  PROVIDER:     Susan Wetzel MD    DATE OF PROCEDURE:  2022    PREOPERATIVE DIAGNOSES:  Comminuted left capitellum and distal humerus  fracture. POSTOPERATIVE DIAGNOSES:  Comminuted left capitellum and distal humerus  fracture. PROCEDURES PERFORMED:  1. Left capitellum open reduction internal fixation with morselized  cancellous allograft with Synthes 2.5 mm CCHS screws, supplemented with  a Synthes 2.0 mm CCHS screw. 2.  Lateral ulnar collateral ligament repair using a Mitek anchor. ANESTHESIA:  1. Brachial plexus block. 2.  General anesthetic. SURGEON:  Susan Wetzel MD    ASSISTANT:  _____, Orthopedic Surgery resident. TOURNIQUET TIME:  Approximately 70 minutes at 250 mmHg brachial  tourniquet. BLOOD LOSS:  Minimal.    FINDINGS:  1. Intraoperative fluoroscopy was used to confirm a comminuted  capitellum fracture which was displaced and rotated 180 degrees. 2.  There was posterior comminution to the capitellum that engaged after  repair of the large capitellar fracture that necessitated bone grafting  and supplemental fixation with a 2.0 CCHS screw. 3.  Status post ORIF of the capitellum and comminution, and repair of  the lateral ulnar collateral ligament. The elbow was stable throughout  the range of motion with resolution of the engagement. COMPLICATIONS:  None. DISPOSITION:  The patient remained stable throughout the procedure. INDICATIONS:  The patient is a 14-year-old female with a comminuted  displaced capitellum fracture.   CT scan revealed some further  displacement of the fracture and cannulated guide wires. These were placed along the lateral rim of the articular cartilage of  the capitellum from anterior to posterior. Near anatomic alignment was  confirmed under fluoroscopic imaging both the AP and lateral planes. There remained posterior comminution and impaction. These guidewires  were then exchanged for measured screws. A 30 and a 32 mm Synthes CCHS  2.5-mm screws were inserted with excellent capture achieved. This  provided excellent stability to the capitellum with good rotation with  flexion and extension, however, in extension the radial head did engage  into the impacted portion of the capitellum, therefore, this was  addressed. The lateral ulnar collateral ligament was taken down  followed by later repair to allow access to the posterior aspect of the  capitellum. The olecranon and olecranon fossa were addressed and  evacuated of any loose articular fracture fragments. The articular  reduction to the medial aspect of the trochlear spool was near anatomic. The impacted portion of the capitellum was disimpacted, morselized  cancellous allograft bone graft was then impacted and the impacted  fracture re-reduced and held with a 2.0 guidewire. The length of the  screw was estimated about 20 mm as proximal drilled followed by  insertion of the screw, which provided excellent capture of the  capitellum, reducing this posterior articular surface. The lateral collateral ligament was then repaired using a Mitek anchor. The wound was copious irrigated out and the lateral ulnar collateral  ligament was then reefed, reducing and repairing the lateral ulnar  collateral ligament. The elbow was then reexamined under fluoroscopy. All screws were deemed appropriate in position and alignment. There was  full elbow flexion. Extension was full without any engagement of the  radial head into the impaction and full pronation and supination.   This  confirmed the lateral ulnar collateral ligament was then reefed further  with the FiberWire sutures. The lateral ulnar collateral ligament  complex was repaired with nonabsorbable sutures followed by deflation of  the tourniquet. Hemostasis was achieved with electrocautery. Skin was  closed in layers. Sterile dressing and splint were applied.         Reta Presley MD    D: 07/13/2022 14:23:49       T: 07/13/2022 14:27:32     [id number]  Job#: [id number]     Doc#: [id number]    CC:
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
## 2022-07-14 NOTE — ANESTHESIA POSTPROCEDURE EVALUATION
Department of Anesthesiology  Postprocedure Note    Patient: Dipak Sweeney  MRN: [id number]  YOB: 1980  Date of evaluation: 7/14/2022      Procedure Summary     Date: 07/13/22 Room / Location: [facility] OR 04 / [facility]    Anesthesia Start: 1226 Anesthesia Stop: 9403    Procedure: OPEN REDUCTION INTERNAL FIXATION LEFT ELBOW WITH LIGAMENTUS REPAIR  (Left Elbow) Diagnosis:       Fracture      (Fracture [T14. Coretha Seals)    Surgeons: Delane Romberg, MD Responsible Provider: Vicki De Leon MD    Anesthesia Type: General ASA Status: 1          Anesthesia Type: General    Jessica Phase I: Jessica Score: 10    Jessica Phase II:        Anesthesia Post Evaluation    Patient location during evaluation: PACU  Patient participation: complete - patient participated  Level of consciousness: awake and alert  Airway patency: patent  Nausea & Vomiting: no nausea and no vomiting  Complications: no  Cardiovascular status: blood pressure returned to baseline and hemodynamically stable  Respiratory status: acceptable and spontaneous ventilation  Hydration status: euvolemic  Multimodal analgesia pain management approach

## 2022-07-14 NOTE — CARE COORDINATION
Social Work Discharge Planning:  Pt agreeable to acute rehab here,if appropriate. Kemal Nielsen  left Dillon and [employer] at bedside. SW/CM  Will to continue to follow. and assist with transition of care.   Electronically signed by Neomi Bosworth, LSW on 7/14/2022 at 3:32 PM

## 2022-07-14 NOTE — PROGRESS NOTES
Department of Orthopedic Surgery  Resident Progress Note    Patient seen and examined. Pain increased in LUE after block wore off, still controlled. No new complaints. Denies chest pain, shortness of breath, dizziness/lightheadedness. Explained surgical procedure and reviewed xrays with patient this AM.    VITALS:  /64   Pulse 87   Temp 98.3 °F (36.8 °C) (Temporal)   Resp 16   Ht 5' (1.524 m)   Wt 140 lb (63.5 kg)   LMP 06/28/2022   SpO2 97%   BMI 27.34 kg/m²     General: alert and oriented to person, place and time, well-developed and well-nourished, in no acute distress    MUSCULOSKELETAL:   left upper extremity:  · Splint C/D/I. Kristofer pillow in place. · Palpable compartments soft and compressible  · +AIN/PIN/Ulnar/Median/Radial nerve function intact grossly  · Cap refill <2 sec  · Sensation intact to touch in radial/ulnar/median nerve distributions to hand    Left lower extremity:  · Compartments soft and compressible, calf non-tender  · Palpable dorsalis pedis and posterior tibialis pulse, brisk cap refill to toes, foot warm and perfused  · Sensation intact to light touch in sural/deep peroneal/superficial peroneal/saphenous/posterior tibial nerve distributions to foot/ankle. · Demonstrates active ankle plantar/dorsiflexion/great toe extension  · Pain with logroll. CBC:   Lab Results   Component Value Date/Time    WBC 11.6 07/13/2022 11:00 AM    HGB 11.9 07/13/2022 11:00 AM    HCT 37.1 07/13/2022 11:00 AM     07/13/2022 11:00 AM     PT/INR:    Lab Results   Component Value Date/Time    PROTIME 12.9 07/11/2022 02:15 PM    INR 1.2 07/11/2022 02:15 PM       ASSESSMENT  · S/P L Capitellum ORIF - 7/13/22    PLAN      · Continue physical therapy and protocol: SAURAV MURPHY  · 24 hour abx coverage  · Deep venous thrombosis prophylaxis - per trauma. Recommend anticoagulation post operative day, early mobilization  · Doing well POD #1 from left capitellum ORIF.    · Pelvis will be managed by orthopedic trauma service. · PT/OT  · Pain Control: Per trauma. Shivani script in chart. · Monitor H&H- awaiting new labs  · D/C Plan:  Per primary team.  From an orthopedic hand perspective patient may be discharged to appropriate level of care when when mobilizing appropriately and final plan in place. Follow up with Dr. Uzair Coles in 8-10 days. DC instruction per hand service in chart.
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
## 2022-07-15 LAB
BASOPHILS ABSOLUTE: 0.05 E9/L (ref 0–0.2)
BASOPHILS RELATIVE PERCENT: 0.9 % (ref 0–2)
EOSINOPHILS ABSOLUTE: 0.23 E9/L (ref 0.05–0.5)
EOSINOPHILS RELATIVE PERCENT: 4.3 % (ref 0–6)
HCT VFR BLD CALC: 35.6 % (ref 34–48)
HEMOGLOBIN: 11.8 G/DL (ref 11.5–15.5)
IMMATURE GRANULOCYTES #: 0.02 E9/L
IMMATURE GRANULOCYTES %: 0.4 % (ref 0–5)
LYMPHOCYTES ABSOLUTE: 1.21 E9/L (ref 1.5–4)
LYMPHOCYTES RELATIVE PERCENT: 22.8 % (ref 20–42)
MCH RBC QN AUTO: 30.5 PG (ref 26–35)
MCHC RBC AUTO-ENTMCNC: 33.1 % (ref 32–34.5)
MCV RBC AUTO: 92 FL (ref 80–99.9)
MONOCYTES ABSOLUTE: 0.35 E9/L (ref 0.1–0.95)
MONOCYTES RELATIVE PERCENT: 6.6 % (ref 2–12)
NEUTROPHILS ABSOLUTE: 3.45 E9/L (ref 1.8–7.3)
NEUTROPHILS RELATIVE PERCENT: 65 % (ref 43–80)
PDW BLD-RTO: 13.2 FL (ref 11.5–15)
PLATELET # BLD: 200 E9/L (ref 130–450)
PMV BLD AUTO: 9.5 FL (ref 7–12)
RBC # BLD: 3.87 E12/L (ref 3.5–5.5)
WBC # BLD: 5.3 E9/L (ref 4.5–11.5)

## 2022-07-15 PROCEDURE — 6370000000 HC RX 637 (ALT 250 FOR IP): Performed by: STUDENT IN AN ORGANIZED HEALTH CARE EDUCATION/TRAINING PROGRAM

## 2022-07-15 PROCEDURE — 6370000000 HC RX 637 (ALT 250 FOR IP)

## 2022-07-15 PROCEDURE — 36415 COLL VENOUS BLD VENIPUNCTURE: CPT

## 2022-07-15 PROCEDURE — 97535 SELF CARE MNGMENT TRAINING: CPT

## 2022-07-15 PROCEDURE — 2580000003 HC RX 258

## 2022-07-15 PROCEDURE — 6370000000 HC RX 637 (ALT 250 FOR IP): Performed by: CLINICAL NURSE SPECIALIST

## 2022-07-15 PROCEDURE — 6360000002 HC RX W HCPCS

## 2022-07-15 PROCEDURE — 85025 COMPLETE CBC W/AUTO DIFF WBC: CPT

## 2022-07-15 PROCEDURE — 1200000000 HC SEMI PRIVATE

## 2022-07-15 PROCEDURE — 97530 THERAPEUTIC ACTIVITIES: CPT

## 2022-07-15 PROCEDURE — 2580000003 HC RX 258: Performed by: ANESTHESIOLOGY

## 2022-07-15 PROCEDURE — 99232 SBSQ HOSP IP/OBS MODERATE 35: CPT | Performed by: SURGERY

## 2022-07-15 RX ADMIN — GABAPENTIN 100 MG: 100 CAPSULE ORAL at 20:40

## 2022-07-15 RX ADMIN — ACETAMINOPHEN 325MG 650 MG: 325 TABLET ORAL at 20:40

## 2022-07-15 RX ADMIN — OXYCODONE HYDROCHLORIDE 10 MG: 10 TABLET ORAL at 17:10

## 2022-07-15 RX ADMIN — SODIUM CHLORIDE, PRESERVATIVE FREE 10 ML: 5 INJECTION INTRAVENOUS at 08:30

## 2022-07-15 RX ADMIN — OXYCODONE HYDROCHLORIDE 10 MG: 10 TABLET ORAL at 08:29

## 2022-07-15 RX ADMIN — METHOCARBAMOL 1000 MG: 500 TABLET ORAL at 20:40

## 2022-07-15 RX ADMIN — SENNOSIDES AND DOCUSATE SODIUM 1 TABLET: 50; 8.6 TABLET ORAL at 20:40

## 2022-07-15 RX ADMIN — OXYCODONE HYDROCHLORIDE 10 MG: 10 TABLET ORAL at 03:59

## 2022-07-15 RX ADMIN — METHOCARBAMOL 1000 MG: 500 TABLET ORAL at 17:10

## 2022-07-15 RX ADMIN — OXYCODONE HYDROCHLORIDE 10 MG: 10 TABLET ORAL at 00:07

## 2022-07-15 RX ADMIN — ACETAMINOPHEN 325MG 650 MG: 325 TABLET ORAL at 17:10

## 2022-07-15 RX ADMIN — SODIUM CHLORIDE, PRESERVATIVE FREE 10 ML: 5 INJECTION INTRAVENOUS at 08:27

## 2022-07-15 RX ADMIN — METHOCARBAMOL 1000 MG: 500 TABLET ORAL at 08:29

## 2022-07-15 RX ADMIN — ACETAMINOPHEN 325MG 650 MG: 325 TABLET ORAL at 03:59

## 2022-07-15 RX ADMIN — SENNOSIDES AND DOCUSATE SODIUM 1 TABLET: 50; 8.6 TABLET ORAL at 08:29

## 2022-07-15 RX ADMIN — GABAPENTIN 100 MG: 100 CAPSULE ORAL at 12:42

## 2022-07-15 RX ADMIN — OXYCODONE HYDROCHLORIDE 10 MG: 10 TABLET ORAL at 12:42

## 2022-07-15 RX ADMIN — POLYETHYLENE GLYCOL 3350 17 G: 17 POWDER, FOR SOLUTION ORAL at 08:29

## 2022-07-15 RX ADMIN — GABAPENTIN 100 MG: 100 CAPSULE ORAL at 08:29

## 2022-07-15 RX ADMIN — OXYCODONE HYDROCHLORIDE 10 MG: 10 TABLET ORAL at 22:18

## 2022-07-15 RX ADMIN — METHOCARBAMOL 1000 MG: 500 TABLET ORAL at 12:42

## 2022-07-15 RX ADMIN — ENOXAPARIN SODIUM 30 MG: 100 INJECTION SUBCUTANEOUS at 20:39

## 2022-07-15 RX ADMIN — ACETAMINOPHEN 325MG 650 MG: 325 TABLET ORAL at 08:29

## 2022-07-15 RX ADMIN — ENOXAPARIN SODIUM 30 MG: 100 INJECTION SUBCUTANEOUS at 08:27

## 2022-07-15 ASSESSMENT — PAIN DESCRIPTION - DESCRIPTORS
DESCRIPTORS: ACHING;DISCOMFORT;SORE
DESCRIPTORS: ACHING

## 2022-07-15 ASSESSMENT — PAIN DESCRIPTION - FREQUENCY
FREQUENCY: CONTINUOUS

## 2022-07-15 ASSESSMENT — PAIN DESCRIPTION - LOCATION
LOCATION: ELBOW
LOCATION: ELBOW;HIP
LOCATION: ELBOW
LOCATION: ELBOW;HIP
LOCATION: ELBOW

## 2022-07-15 ASSESSMENT — PAIN DESCRIPTION - PAIN TYPE
TYPE: SURGICAL PAIN

## 2022-07-15 ASSESSMENT — PAIN DESCRIPTION - ORIENTATION
ORIENTATION: LEFT

## 2022-07-15 ASSESSMENT — PAIN SCALES - WONG BAKER: WONGBAKER_NUMERICALRESPONSE: 0

## 2022-07-15 ASSESSMENT — PAIN SCALES - GENERAL
PAINLEVEL_OUTOF10: 7
PAINLEVEL_OUTOF10: 5
PAINLEVEL_OUTOF10: 4
PAINLEVEL_OUTOF10: 9
PAINLEVEL_OUTOF10: 7

## 2022-07-15 ASSESSMENT — PAIN DESCRIPTION - ONSET
ONSET: ON-GOING
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
## 2022-07-15 NOTE — PROGRESS NOTES
Controlled Substance Monitoring:    Acute and Chronic Pain Monitoring:   RX Monitoring 7/15/2022   Periodic Controlled Substance Monitoring No signs of potential drug abuse or diversion identified.
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
## 2022-07-15 NOTE — PROGRESS NOTES
Consult received. Dr. Guy Noe to evaluate patient's appropriateness for ARU.     Shannan Hernandez RN, Pre-screener for Acute Rehab  P: [phone]

## 2022-07-15 NOTE — PROGRESS NOTES
Patient is appropriate for ARU, however ARU will not have any available beds until next Tuesday. Updated unit  Varsha and  Mariam Quick at this time. Offered to initiate precert today with admission date of 7/19/22, but Mariam Quick states patient has agreed to go to [facility].     Alonso Mckinnon RN, Pre-screener for Acute Rehab  P: [phone]
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
## 2022-07-16 PROCEDURE — 1200000000 HC SEMI PRIVATE

## 2022-07-16 PROCEDURE — 6370000000 HC RX 637 (ALT 250 FOR IP): Performed by: CLINICAL NURSE SPECIALIST

## 2022-07-16 PROCEDURE — 99232 SBSQ HOSP IP/OBS MODERATE 35: CPT | Performed by: SURGERY

## 2022-07-16 PROCEDURE — 6360000002 HC RX W HCPCS

## 2022-07-16 PROCEDURE — 6370000000 HC RX 637 (ALT 250 FOR IP)

## 2022-07-16 PROCEDURE — 2580000003 HC RX 258

## 2022-07-16 PROCEDURE — 6370000000 HC RX 637 (ALT 250 FOR IP): Performed by: STUDENT IN AN ORGANIZED HEALTH CARE EDUCATION/TRAINING PROGRAM

## 2022-07-16 RX ADMIN — ENOXAPARIN SODIUM 30 MG: 100 INJECTION SUBCUTANEOUS at 20:20

## 2022-07-16 RX ADMIN — POLYETHYLENE GLYCOL 3350 17 G: 17 POWDER, FOR SOLUTION ORAL at 08:10

## 2022-07-16 RX ADMIN — ACETAMINOPHEN 325MG 650 MG: 325 TABLET ORAL at 08:10

## 2022-07-16 RX ADMIN — SENNOSIDES AND DOCUSATE SODIUM 1 TABLET: 50; 8.6 TABLET ORAL at 08:10

## 2022-07-16 RX ADMIN — OXYCODONE HYDROCHLORIDE 10 MG: 10 TABLET ORAL at 03:17

## 2022-07-16 RX ADMIN — GABAPENTIN 100 MG: 100 CAPSULE ORAL at 14:06

## 2022-07-16 RX ADMIN — GABAPENTIN 100 MG: 100 CAPSULE ORAL at 20:19

## 2022-07-16 RX ADMIN — METHOCARBAMOL 1000 MG: 500 TABLET ORAL at 08:09

## 2022-07-16 RX ADMIN — METHOCARBAMOL 1000 MG: 500 TABLET ORAL at 14:06

## 2022-07-16 RX ADMIN — ACETAMINOPHEN 325MG 650 MG: 325 TABLET ORAL at 16:28

## 2022-07-16 RX ADMIN — METHOCARBAMOL 1000 MG: 500 TABLET ORAL at 20:20

## 2022-07-16 RX ADMIN — SODIUM CHLORIDE, PRESERVATIVE FREE 10 ML: 5 INJECTION INTRAVENOUS at 08:08

## 2022-07-16 RX ADMIN — SENNOSIDES AND DOCUSATE SODIUM 1 TABLET: 50; 8.6 TABLET ORAL at 20:21

## 2022-07-16 RX ADMIN — OXYCODONE HYDROCHLORIDE 10 MG: 10 TABLET ORAL at 12:11

## 2022-07-16 RX ADMIN — ACETAMINOPHEN 325MG 650 MG: 325 TABLET ORAL at 20:20

## 2022-07-16 RX ADMIN — OXYCODONE HYDROCHLORIDE 10 MG: 10 TABLET ORAL at 08:09

## 2022-07-16 RX ADMIN — ACETAMINOPHEN 325MG 650 MG: 325 TABLET ORAL at 03:17

## 2022-07-16 RX ADMIN — ENOXAPARIN SODIUM 30 MG: 100 INJECTION SUBCUTANEOUS at 08:08

## 2022-07-16 RX ADMIN — GABAPENTIN 100 MG: 100 CAPSULE ORAL at 08:08

## 2022-07-16 ASSESSMENT — PAIN DESCRIPTION - FREQUENCY
FREQUENCY: CONTINUOUS

## 2022-07-16 ASSESSMENT — PAIN DESCRIPTION - DESCRIPTORS
DESCRIPTORS: ACHING
DESCRIPTORS: ACHING;DISCOMFORT;POUNDING
DESCRIPTORS: ACHING;DISCOMFORT;THROBBING
DESCRIPTORS: ACHING;DISCOMFORT;THROBBING
DESCRIPTORS: THROBBING
DESCRIPTORS: THROBBING
DESCRIPTORS: ACHING;DISCOMFORT;THROBBING

## 2022-07-16 ASSESSMENT — PAIN DESCRIPTION - PAIN TYPE
TYPE: SURGICAL PAIN

## 2022-07-16 ASSESSMENT — PAIN SCALES - GENERAL
PAINLEVEL_OUTOF10: 7
PAINLEVEL_OUTOF10: 6
PAINLEVEL_OUTOF10: 5
PAINLEVEL_OUTOF10: 8
PAINLEVEL_OUTOF10: 4
PAINLEVEL_OUTOF10: 7
PAINLEVEL_OUTOF10: 4
PAINLEVEL_OUTOF10: 3
PAINLEVEL_OUTOF10: 3

## 2022-07-16 ASSESSMENT — PAIN DESCRIPTION - ORIENTATION
ORIENTATION: LEFT

## 2022-07-16 ASSESSMENT — PAIN DESCRIPTION - LOCATION
LOCATION: SHOULDER
LOCATION: SHOULDER
LOCATION: SHOULDER;HIP
LOCATION: HIP;SHOULDER
LOCATION: HIP;SHOULDER
LOCATION: ELBOW;HIP
LOCATION: ARM;HEAD

## 2022-07-16 ASSESSMENT — PAIN SCALES - WONG BAKER
WONGBAKER_NUMERICALRESPONSE: 0
WONGBAKER_NUMERICALRESPONSE: 0

## 2022-07-16 ASSESSMENT — PAIN DESCRIPTION - ONSET
ONSET: ON-GOING

## 2022-07-16 NOTE — PLAN OF CARE
Problem: Discharge Planning  Goal: Discharge to home or other facility with appropriate resources  7/16/2022 1509 by Karla Vidal RN  Outcome: Progressing  7/16/2022 0955 by Karla Vidal RN  Outcome: Progressing  Flowsheets (Taken 7/16/2022 0506)  Discharge to home or other facility with appropriate resources:   Identify barriers to discharge with patient and caregiver   Arrange for needed discharge resources and transportation as appropriate     Problem: Skin/Tissue Integrity  Goal: Absence of new skin breakdown  7/16/2022 1509 by Karla Vidal RN  Outcome: Progressing  7/16/2022 0955 by Karla Vidal RN  Outcome: Progressing     Problem: Safety - Adult  Goal: Free from fall injury  7/16/2022 1509 by Karla Vidal RN  Outcome: Progressing  7/16/2022 0955 by Karla Vidal RN  Outcome: Progressing     Problem: ABCDS Injury Assessment  Goal: Absence of physical injury  7/16/2022 1509 by Karla Vidal RN  Outcome: Progressing  7/16/2022 0955 by Karla Vidal RN  Outcome: Progressing     Problem: Pain  Goal: Verbalizes/displays adequate comfort level or baseline comfort level  7/16/2022 1509 by Karla Vidal RN  Outcome: Progressing  7/16/2022 0955 by Karla Vidal RN  Outcome: Progressing  Flowsheets (Taken 7/16/2022 0742)  Verbalizes/displays adequate comfort level or baseline comfort level:   Encourage patient to monitor pain and request assistance   Assess pain using appropriate pain scale
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
## 2022-07-16 NOTE — PLAN OF CARE
Problem: Discharge Planning  Goal: Discharge to home or other facility with appropriate resources  Outcome: Progressing  Flowsheets (Taken 7/16/2022 0742)  Discharge to home or other facility with appropriate resources:   Identify barriers to discharge with patient and caregiver   Arrange for needed discharge resources and transportation as appropriate     Problem: Skin/Tissue Integrity  Goal: Absence of new skin breakdown  Outcome: Progressing     Problem: Safety - Adult  Goal: Free from fall injury  Outcome: Progressing     Problem: ABCDS Injury Assessment  Goal: Absence of physical injury  Outcome: Progressing     Problem: Pain  Goal: Verbalizes/displays adequate comfort level or baseline comfort level  Outcome: Progressing  Flowsheets (Taken 7/16/2022 0742)  Verbalizes/displays adequate comfort level or baseline comfort level:   Encourage patient to monitor pain and request assistance   Assess pain using appropriate pain scale

## 2022-07-17 PROCEDURE — 1200000000 HC SEMI PRIVATE

## 2022-07-17 PROCEDURE — 99232 SBSQ HOSP IP/OBS MODERATE 35: CPT | Performed by: SURGERY

## 2022-07-17 PROCEDURE — 6370000000 HC RX 637 (ALT 250 FOR IP): Performed by: CLINICAL NURSE SPECIALIST

## 2022-07-17 PROCEDURE — 6370000000 HC RX 637 (ALT 250 FOR IP)

## 2022-07-17 PROCEDURE — 6360000002 HC RX W HCPCS

## 2022-07-17 PROCEDURE — 6370000000 HC RX 637 (ALT 250 FOR IP): Performed by: STUDENT IN AN ORGANIZED HEALTH CARE EDUCATION/TRAINING PROGRAM

## 2022-07-17 RX ORDER — IBUPROFEN 600 MG/1
600 TABLET ORAL EVERY 6 HOURS PRN
Status: DISCONTINUED | OUTPATIENT
Start: 2022-07-17 | End: 2022-07-18 | Stop reason: HOSPADM

## 2022-07-17 RX ADMIN — IBUPROFEN 600 MG: 600 TABLET, FILM COATED ORAL at 20:47

## 2022-07-17 RX ADMIN — IBUPROFEN 600 MG: 600 TABLET, FILM COATED ORAL at 14:32

## 2022-07-17 RX ADMIN — ENOXAPARIN SODIUM 30 MG: 100 INJECTION SUBCUTANEOUS at 08:23

## 2022-07-17 RX ADMIN — IBUPROFEN 600 MG: 600 TABLET, FILM COATED ORAL at 08:22

## 2022-07-17 RX ADMIN — GABAPENTIN 100 MG: 100 CAPSULE ORAL at 08:23

## 2022-07-17 RX ADMIN — GABAPENTIN 100 MG: 100 CAPSULE ORAL at 13:41

## 2022-07-17 RX ADMIN — ACETAMINOPHEN 325MG 650 MG: 325 TABLET ORAL at 08:24

## 2022-07-17 RX ADMIN — SENNOSIDES AND DOCUSATE SODIUM 1 TABLET: 50; 8.6 TABLET ORAL at 08:25

## 2022-07-17 RX ADMIN — METHOCARBAMOL 1000 MG: 500 TABLET ORAL at 08:24

## 2022-07-17 RX ADMIN — METHOCARBAMOL 1000 MG: 500 TABLET ORAL at 13:41

## 2022-07-17 RX ADMIN — ACETAMINOPHEN 325MG 650 MG: 325 TABLET ORAL at 04:00

## 2022-07-17 RX ADMIN — ACETAMINOPHEN 325MG 650 MG: 325 TABLET ORAL at 20:47

## 2022-07-17 RX ADMIN — SENNOSIDES AND DOCUSATE SODIUM 1 TABLET: 50; 8.6 TABLET ORAL at 20:47

## 2022-07-17 RX ADMIN — METHOCARBAMOL 1000 MG: 500 TABLET ORAL at 16:30

## 2022-07-17 RX ADMIN — ENOXAPARIN SODIUM 30 MG: 100 INJECTION SUBCUTANEOUS at 20:47

## 2022-07-17 RX ADMIN — GABAPENTIN 100 MG: 100 CAPSULE ORAL at 20:47

## 2022-07-17 RX ADMIN — ACETAMINOPHEN 325MG 650 MG: 325 TABLET ORAL at 16:20

## 2022-07-17 RX ADMIN — METHOCARBAMOL 1000 MG: 500 TABLET ORAL at 20:46

## 2022-07-17 RX ADMIN — POLYETHYLENE GLYCOL 3350 17 G: 17 POWDER, FOR SOLUTION ORAL at 08:25

## 2022-07-17 ASSESSMENT — PAIN SCALES - GENERAL
PAINLEVEL_OUTOF10: 3
PAINLEVEL_OUTOF10: 8
PAINLEVEL_OUTOF10: 6
PAINLEVEL_OUTOF10: 6
PAINLEVEL_OUTOF10: 5
PAINLEVEL_OUTOF10: 2
PAINLEVEL_OUTOF10: 4

## 2022-07-17 ASSESSMENT — PAIN DESCRIPTION - DESCRIPTORS
DESCRIPTORS: ACHING;DISCOMFORT;TENDER
DESCRIPTORS: ACHING;DISCOMFORT;THROBBING
DESCRIPTORS: ACHING;DISCOMFORT;THROBBING
DESCRIPTORS: THROBBING
DESCRIPTORS: ACHING;DISCOMFORT;THROBBING
DESCRIPTORS: ACHING;DISCOMFORT;TENDER

## 2022-07-17 ASSESSMENT — PAIN DESCRIPTION - ORIENTATION
ORIENTATION: LEFT

## 2022-07-17 ASSESSMENT — PAIN DESCRIPTION - PAIN TYPE
TYPE: ACUTE PAIN;SURGICAL PAIN

## 2022-07-17 ASSESSMENT — PAIN DESCRIPTION - FREQUENCY
FREQUENCY: CONTINUOUS

## 2022-07-17 ASSESSMENT — PAIN DESCRIPTION - LOCATION
LOCATION: HIP

## 2022-07-17 ASSESSMENT — PAIN DESCRIPTION - ONSET
ONSET: ON-GOING
ONSET: ON-GOING
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
## 2022-07-17 NOTE — PROGRESS NOTES
Physical Therapy  Facility/Department: [facility]    Name: Elissa Sainz  : 1980  MRN: [id number]      Date: 2022      Room #:  4321/4097-P      Physical therapy evaluation attempted however could not be completed at this time due to:     [x] Pt refusal despite encouragement from PT  [] Pt currently off the unit at testing/procedure   [] Pt with nursing for hygiene care  [] Pt at hemodialysis  [] Pt on hold and awaiting medical clarification   [] Other:   Comments: Pt declined therapy this afternoon due to having a visitor. PT offered to assist pt into bedside chair to continue visiting with family member. Pt declined transfer and therapy at this time. Will attempt again when pt is able to participate in evaluation. Thank you kindly for the opportunity to assist in the care of this pt.        Virgilio Vasquez, PT, DPT   [id number]

## 2022-07-17 NOTE — PLAN OF CARE
Problem: Discharge Planning  Goal: Discharge to home or other facility with appropriate resources  7/17/2022 1509 by Yoni Elizondo RN  Outcome: Progressing  7/17/2022 0926 by Yoni Elizondo RN  Outcome: Progressing  Flowsheets (Taken 7/17/2022 6007)  Discharge to home or other facility with appropriate resources: Identify barriers to discharge with patient and caregiver     Problem: Skin/Tissue Integrity  Goal: Absence of new skin breakdown  7/17/2022 1509 by Yoni Elizondo RN  Outcome: Progressing  7/17/2022 0926 by Yoni Elizondo RN  Outcome: Progressing     Problem: Safety - Adult  Goal: Free from fall injury  7/17/2022 1509 by Yoni Elizondo RN  Outcome: Progressing  7/17/2022 0926 by Yoni Elizondo RN  Outcome: Progressing     Problem: ABCDS Injury Assessment  Goal: Absence of physical injury  7/17/2022 1509 by Yoni Elizondo RN  Outcome: Progressing  7/17/2022 0926 by Yoni Elizondo RN  Outcome: Progressing     Problem: Pain  Goal: Verbalizes/displays adequate comfort level or baseline comfort level  7/17/2022 1509 by Yoni Elizondo RN  Outcome: Progressing  7/17/2022 0926 by Yoni Elizondo RN  Outcome: Progressing  Flowsheets (Taken 7/17/2022 4937)  Verbalizes/displays adequate comfort level or baseline comfort level: Encourage patient to monitor pain and request assistance

## 2022-07-17 NOTE — PLAN OF CARE
Problem: Discharge Planning  Goal: Discharge to home or other facility with appropriate resources  7/16/2022 2158 by Janiya Collins RN  Outcome: Progressing  7/16/2022 1509 by Dipti Keller RN  Outcome: Progressing  7/16/2022 0955 by Dipti Keller RN  Outcome: Progressing  Flowsheets (Taken 7/16/2022 1078)  Discharge to home or other facility with appropriate resources:   Identify barriers to discharge with patient and caregiver   Arrange for needed discharge resources and transportation as appropriate     Problem: Safety - Adult  Goal: Free from fall injury  7/16/2022 2158 by Janiya Collins RN  Outcome: Progressing  7/16/2022 1509 by Dipti Keller RN  Outcome: Progressing  7/16/2022 0955 by Dipti Keller RN  Outcome: Progressing

## 2022-07-17 NOTE — PLAN OF CARE
Problem: Discharge Planning  Goal: Discharge to home or other facility with appropriate resources  7/17/2022 0926 by Chai Aponte RN  Outcome: Progressing  Flowsheets (Taken 7/17/2022 2084)  Discharge to home or other facility with appropriate resources: Identify barriers to discharge with patient and caregiver  7/16/2022 2158 by Sonia Mckinnon RN  Outcome: Progressing     Problem: Skin/Tissue Integrity  Goal: Absence of new skin breakdown  7/17/2022 0926 by Chai Aponte RN  Outcome: Progressing  7/16/2022 2158 by Sonia Mckinnon RN  Outcome: Progressing     Problem: Safety - Adult  Goal: Free from fall injury  7/17/2022 0926 by Chai Aponte RN  Outcome: Progressing  7/16/2022 2158 by Sonia Mckinnon RN  Outcome: Progressing     Problem: ABCDS Injury Assessment  Goal: Absence of physical injury  7/17/2022 0926 by Chai Aponte RN  Outcome: Progressing  7/16/2022 2158 by Sonia Mckinnon RN  Outcome: Progressing     Problem: Pain  Goal: Verbalizes/displays adequate comfort level or baseline comfort level  7/17/2022 0926 by Chai Aponte RN  Outcome: Progressing  Flowsheets (Taken 7/17/2022 1979)  Verbalizes/displays adequate comfort level or baseline comfort level: Encourage patient to monitor pain and request assistance  7/16/2022 2158 by Sonia Mckinnon RN  Outcome: Progressing

## 2022-07-18 ENCOUNTER — TELEPHONE (OUTPATIENT)
Dept: FAMILY MEDICINE CLINIC | Age: 42
End: 2022-07-18

## 2022-07-18 VITALS
BODY MASS INDEX: 27.48 KG/M2 | SYSTOLIC BLOOD PRESSURE: 134 MMHG | OXYGEN SATURATION: 100 % | HEIGHT: 60 IN | RESPIRATION RATE: 18 BRPM | TEMPERATURE: 97 F | DIASTOLIC BLOOD PRESSURE: 57 MMHG | WEIGHT: 140 LBS | HEART RATE: 117 BPM

## 2022-07-18 PROCEDURE — 6370000000 HC RX 637 (ALT 250 FOR IP): Performed by: SURGERY

## 2022-07-18 PROCEDURE — 99232 SBSQ HOSP IP/OBS MODERATE 35: CPT | Performed by: SURGERY

## 2022-07-18 PROCEDURE — 6370000000 HC RX 637 (ALT 250 FOR IP)

## 2022-07-18 PROCEDURE — 97535 SELF CARE MNGMENT TRAINING: CPT

## 2022-07-18 PROCEDURE — 6360000002 HC RX W HCPCS

## 2022-07-18 PROCEDURE — 97530 THERAPEUTIC ACTIVITIES: CPT

## 2022-07-18 PROCEDURE — 6370000000 HC RX 637 (ALT 250 FOR IP): Performed by: CLINICAL NURSE SPECIALIST

## 2022-07-18 PROCEDURE — 6370000000 HC RX 637 (ALT 250 FOR IP): Performed by: STUDENT IN AN ORGANIZED HEALTH CARE EDUCATION/TRAINING PROGRAM

## 2022-07-18 RX ORDER — ACETAMINOPHEN 325 MG/1
650 TABLET ORAL EVERY 6 HOURS
Qty: 120 TABLET | Refills: 0 | Status: SHIPPED | OUTPATIENT
Start: 2022-07-18 | End: 2022-08-31

## 2022-07-18 RX ORDER — METHOCARBAMOL 500 MG/1
1000 TABLET, FILM COATED ORAL 4 TIMES DAILY
Qty: 80 TABLET | Refills: 0 | Status: SHIPPED | OUTPATIENT
Start: 2022-07-18 | End: 2022-08-08 | Stop reason: SDUPTHER

## 2022-07-18 RX ORDER — GABAPENTIN 100 MG/1
200 CAPSULE ORAL 3 TIMES DAILY
Qty: 180 CAPSULE | Refills: 0 | Status: SHIPPED | OUTPATIENT
Start: 2022-07-18 | End: 2022-08-31

## 2022-07-18 RX ORDER — IBUPROFEN 600 MG/1
600 TABLET ORAL EVERY 6 HOURS PRN
Qty: 120 TABLET | Refills: 0 | Status: SHIPPED | OUTPATIENT
Start: 2022-07-18 | End: 2022-08-31

## 2022-07-18 RX ORDER — GABAPENTIN 100 MG/1
200 CAPSULE ORAL 3 TIMES DAILY
Status: DISCONTINUED | OUTPATIENT
Start: 2022-07-18 | End: 2022-07-18 | Stop reason: HOSPADM

## 2022-07-18 RX ADMIN — OXYCODONE HYDROCHLORIDE 10 MG: 10 TABLET ORAL at 16:28

## 2022-07-18 RX ADMIN — GABAPENTIN 200 MG: 100 CAPSULE ORAL at 13:52

## 2022-07-18 RX ADMIN — IBUPROFEN 600 MG: 600 TABLET, FILM COATED ORAL at 08:47

## 2022-07-18 RX ADMIN — IBUPROFEN 600 MG: 600 TABLET, FILM COATED ORAL at 02:50

## 2022-07-18 RX ADMIN — ACETAMINOPHEN 325MG 650 MG: 325 TABLET ORAL at 13:50

## 2022-07-18 RX ADMIN — SENNOSIDES AND DOCUSATE SODIUM 1 TABLET: 50; 8.6 TABLET ORAL at 08:48

## 2022-07-18 RX ADMIN — GABAPENTIN 100 MG: 100 CAPSULE ORAL at 08:48

## 2022-07-18 RX ADMIN — ACETAMINOPHEN 325MG 650 MG: 325 TABLET ORAL at 02:50

## 2022-07-18 RX ADMIN — METHOCARBAMOL 1000 MG: 500 TABLET ORAL at 08:48

## 2022-07-18 RX ADMIN — ENOXAPARIN SODIUM 30 MG: 100 INJECTION SUBCUTANEOUS at 08:47

## 2022-07-18 RX ADMIN — METHOCARBAMOL 1000 MG: 500 TABLET ORAL at 13:50

## 2022-07-18 NOTE — CARE COORDINATION
Social Work Discharge Planning:  SW met with patient with patient and spouse at bedside as requested. SW updated patient [facility] Patient relayed she would really like to go home with Soniyasangeetadelfino Cody and informed SW that they have an outside wheelchair lift to get into the home. SW made a referral to [facility] with Anthony Lopez the patient is accepted. SW will continue to follow and assist with transition of care. Electronically signed by FRED Gardner on 7/18/2022 at 1:55 PM   Addendum. SW made referral  to [facility] with [facility] for a wc and 3:1. SW also inform the patient  is discharging later today.  BOLIVAR set-up wctransportation with [place] at 4 PM.  Electronically signed by FRED Gardner on 7/18/2022 at 2:08 PM
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
## 2022-07-18 NOTE — PROGRESS NOTES
Patient being discharged home today. Medications and discharge instructions reviewed with patient and . No questions or concerns . No  IV to remove. Patient to be picked up by transportation.
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
## 2022-07-18 NOTE — HOME CARE
Referral received for home health. Patient will need home health orders prior to discharge. Notified nurse Floresita James of same.  Thank you, [facility]
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
## 2022-07-18 NOTE — CARE COORDINATION
A wheelchair is necessary because it is the least level of equipment needed for the pt to be mobile in the home to accomplish ADLs. Mobility limitations can not be sufficiently resolved with a cane or walker.

## 2022-07-18 NOTE — PLAN OF CARE
Problem: Discharge Planning  Goal: Discharge to home or other facility with appropriate resources  7/17/2022 2244 by Rogelio Campa RN  Outcome: Progressing  7/17/2022 1509 by Dave Driscoll RN  Outcome: Progressing  7/17/2022 0926 by Dave Driscoll RN  Outcome: Progressing  Flowsheets (Taken 7/17/2022 1539)  Discharge to home or other facility with appropriate resources: Identify barriers to discharge with patient and caregiver     Problem: Safety - Adult  Goal: Free from fall injury  7/17/2022 2244 by Rogelio Campa RN  Outcome: Progressing  7/17/2022 1509 by Dave Driscoll RN  Outcome: Progressing  7/17/2022 0926 by Dave Driscoll RN  Outcome: Progressing
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
## 2022-07-27 ENCOUNTER — TELEPHONE (OUTPATIENT)
Dept: ORTHOPEDIC SURGERY | Age: 42
End: 2022-07-27

## 2022-07-27 DIAGNOSIS — S42.452A CLOSED FRACTURE OF CAPITELLUM OF DISTAL HUMERUS, LEFT, INITIAL ENCOUNTER: Primary | ICD-10-CM

## 2022-07-28 ENCOUNTER — OFFICE VISIT (OUTPATIENT)
Dept: ORTHOPEDIC SURGERY | Age: 42
End: 2022-07-28

## 2022-07-28 VITALS — WEIGHT: 140 LBS | BODY MASS INDEX: 21.97 KG/M2 | HEIGHT: 67 IN

## 2022-07-28 DIAGNOSIS — S42.452A CLOSED FRACTURE OF CAPITELLUM OF DISTAL HUMERUS, LEFT, INITIAL ENCOUNTER: Primary | ICD-10-CM

## 2022-07-28 PROCEDURE — 99024 POSTOP FOLLOW-UP VISIT: CPT | Performed by: ORTHOPAEDIC SURGERY

## 2022-07-28 NOTE — PROGRESS NOTES
HPI:   Patient follows up today s/p Left capitellum ORIF and ligamentous repair. She is now 2 weeks out. No complaints. Pain controlled. Pain in pelvis. Physical Exam:  left elbow incision clean, dry, and intact  No signs of infection  ROM appropriate  today in office out of splint. NVI  Radial, median, and ulnar sensation intact to light touch  Brisk capillary refill  Muscle strength 5/5 grossly    Xrays: Views left elbow AP and lateral taken today in office reviewed with patient. Show stable fixation of previous capitellum of reduction internal fixation with Brock screws. [place] intact. Elbow well located. Impression: Stable fixation status post capitellum ORIF and ligamentous repair. Assessment:  Post-op Left capitellum ORIF    Plan:  Removable elbow brace placed today. No extension past 30 degrees. ROM exercises demonstrated today in clinic. Follow up 2-3  weeks with x-rays  May shower. Okay to be out of brace for ROM exercises. Wear brace in public.  6/68/0416  Debbie Srivastava DO     I have seen and evaluated the patient and agree with the above assessment and plan on today's visit. I have performed the key components of the history and physical examination with significant findings of postop. I concur with the findings and plan as documented.     Tiara Bella MD  7/28/2022

## 2022-07-29 DIAGNOSIS — S32.592A CLOSED FRACTURE OF MULTIPLE PUBIC RAMI, LEFT, INITIAL ENCOUNTER (HCC): Primary | ICD-10-CM

## 2022-08-02 ENCOUNTER — HOSPITAL ENCOUNTER (OUTPATIENT)
Dept: GENERAL RADIOLOGY | Age: 42
Discharge: HOME | End: 2022-08-04
Payer: COMMERCIAL

## 2022-08-02 ENCOUNTER — OFFICE VISIT (OUTPATIENT)
Dept: ORTHOPEDIC SURGERY | Age: 42
End: 2022-08-02
Payer: COMMERCIAL

## 2022-08-02 VITALS — WEIGHT: 140 LBS | BODY MASS INDEX: 21.97 KG/M2 | HEIGHT: 67 IN

## 2022-08-02 DIAGNOSIS — S32.592A CLOSED FRACTURE OF MULTIPLE PUBIC RAMI, LEFT, INITIAL ENCOUNTER (HCC): ICD-10-CM

## 2022-08-02 DIAGNOSIS — S32.592A CLOSED FRACTURE OF MULTIPLE PUBIC RAMI, LEFT, INITIAL ENCOUNTER (HCC): Primary | ICD-10-CM

## 2022-08-02 PROCEDURE — 73502 X-RAY EXAM HIP UNI 2-3 VIEWS: CPT

## 2022-08-02 PROCEDURE — 99213 OFFICE O/P EST LOW 20 MIN: CPT | Performed by: ORTHOPAEDIC SURGERY

## 2022-08-02 PROCEDURE — 99203 OFFICE O/P NEW LOW 30 MIN: CPT | Performed by: ORTHOPAEDIC SURGERY

## 2022-08-02 NOTE — PROGRESS NOTES
Chief Complaint   Patient presents with    Follow-Up from Hospital     Pelvic ring       SUBJECTIVE: Patient presents to clinic for follow-up 3 weeks after sustaining a closed pelvic ring injury, left-sided LC 1 type. She has been nonweightbearing to the left lower extremity. States that she has been working with home therapy. States she does have some pain in the left hemipelvis, worse with lying supine. She denies any numbness tingling or paresthesias into left lower extremity. Denies any weakness. States she has been taking medications as prescribed, the gabapentin has provided \"nerve pain\" relief. Denies any new recent falls or traumas. States she has been abiding by the nonweightbearing precautions. Denies any fevers, chills, chest pain, shortness of breath. Review of Systems -   General ROS: negative for - chills, fatigue, fever or night sweats  Respiratory ROS: no cough, shortness of breath, or wheezing  Cardiovascular ROS: no chest pain or dyspnea on exertion  Gastrointestinal ROS: no abdominal pain, change in bowel habits, or black or bloody stools  Genitourinary: no hematuria, dysuria, or incontinence   Musculoskeletal ROS:see above  Neurological ROS: no TIA or stroke symptoms       OBJECTIVE:   Alert and oriented X 3, no acute distress, respirations easy and unlabored with no audible wheezes, skin warm and dry, speech and dress appropriate for noted age, affect euthymic. Extremity: Left lower  Patient presents in wheelchair today  Skin examination reveals no superficial lacerations or abrasions. No significant lower extremity edema noted. Calf supple nontender  Demonstrates active knee and ankle flexion/extension. +EHL  Sensation grossly intact in sural, saphenous, tibial, deep and superficial peroneal nerve distributions  Mild tenderness palpation over the pelvic girdle  +2/4 DP and PT pulses, foot warm well-perfused, brisk cap refill to the toes      XR: 8/2/22 AP pelvis and 2 views of the left hip reviewed demonstrating previously seen LC 1 pelvic ring injury. There does appear to be increase in the rotational deformity of the superior and inferior pubic rami fractures. No new fractures or dislocations noted. Ht 5' 7\" (1.702 m)   Wt 140 lb (63.5 kg)   BMI 21.93 kg/m²     ASSESSMENT:     Diagnosis Orders   1. Cl[facility]ic rami, left[facility]            PLAN:  Nonweightbearing left lower extremity  Continue medications as prescribed  Will order CT of the pelvis with 3D reconstructions to further evaluate for any displacement about the left hemipelvis. Electronically signed by Anupama Parra DO on 8/2/2022 at 10:06 AM  Note: This report was completed using [employer] voiced recognition software. Every effort has been made to ensure accuracy; however, inadvertent computerized transcription errors may be present. Patient seen and examined along with resident today. Patient does have slightly displacement of the left hemipelvis. For this reason I think CT with 3D recons will be very helpful to see if we need to pursue operative management. Patient is agreeable with the plan.

## 2022-08-08 RX ORDER — METHOCARBAMOL 500 MG/1
1000 TABLET, FILM COATED ORAL 4 TIMES DAILY
Qty: 80 TABLET | Refills: 0 | Status: SHIPPED | OUTPATIENT
Start: 2022-08-08 | End: 2022-08-18

## 2022-08-08 NOTE — TELEPHONE ENCOUNTER
Orders signed. Please see attached. Thank you.   Electronically signed by Yolette Robin PA-C on 8/8/2022 at 1:11 PM

## 2022-08-08 NOTE — TELEPHONE ENCOUNTER
Patient calling in to request a refill of Robaxin. closed pelvic ring injury, left-sided  From 7/11/22.     Future Appointments   Date Time Provider Tata Josue   8/9/2022  3:00 PM [facility] CT SCAN 3 SEYZ CT [facility] Radiol   8/9/2022  3:30 PM [facility] CT SCAN 3 SEYZ CT [facility]

## 2022-08-09 ENCOUNTER — HOSPITAL ENCOUNTER (OUTPATIENT)
Dept: CT IMAGING | Age: 42
Discharge: HOME | End: 2022-08-11
Payer: OTHER GOVERNMENT

## 2022-08-09 DIAGNOSIS — S32.592A CLOSED FRACTURE OF MULTIPLE PUBIC RAMI, LEFT, INITIAL ENCOUNTER (HCC): ICD-10-CM

## 2022-08-09 PROCEDURE — 76376 3D RENDER W/INTRP POSTPROCES: CPT

## 2022-08-09 PROCEDURE — 72192 CT PELVIS W/O DYE: CPT

## 2022-08-10 ENCOUNTER — TELEPHONE (OUTPATIENT)
Dept: FAMILY MEDICINE CLINIC | Age: 42
End: 2022-08-10

## 2022-08-10 ENCOUNTER — TELEPHONE (OUTPATIENT)
Dept: ORTHOPEDIC SURGERY | Age: 42
End: 2022-08-10

## 2022-08-10 NOTE — TELEPHONE ENCOUNTER
[facility] called and wanted you to know that pt has been discharged, she has met all goals.

## 2022-08-10 NOTE — TELEPHONE ENCOUNTER
Patient called to see if the results for her CT on 8/9/22 has populated and to discuss the findings. No future appointments.     Routed to providers

## 2022-08-16 ENCOUNTER — TELEPHONE (OUTPATIENT)
Dept: ORTHOPEDIC SURGERY | Age: 42
End: 2022-08-16

## 2022-08-16 DIAGNOSIS — S42.452A CLOSED FRACTURE OF CAPITELLUM OF DISTAL HUMERUS, LEFT, INITIAL ENCOUNTER: Primary | ICD-10-CM

## 2022-08-16 NOTE — TELEPHONE ENCOUNTER
Scheduled appt   Future Appointments   Date Time Provider Tata Tomasi   8/18/2022 10:40 AM Edwin Srivastava MD [facility]   8/31/2022 11:15 AM Lady Platt MD  [facility]

## 2022-08-16 NOTE — TELEPHONE ENCOUNTER
----- Message from DR WILLIE JANG [facility] sent at 8/15/2022 11:25 AM EDT -----  Regarding: Please schedule 1-2 weeks with TS

## 2022-08-17 NOTE — PROGRESS NOTES
Prior auth completed via Cover My Meds. Chlorzoxazone 500MG tablets is approved and no other action on Cover My Meds is required. Routed to providers for medication order.     Future Appointments   Date Time Provider Tata Liat   8/18/2022 10:40 AM Lisa Quinn MD [facility]   8/31/2022 11:15 AM Corrie Dance, MD [facility]

## 2022-08-18 ENCOUNTER — OFFICE VISIT (OUTPATIENT)
Dept: ORTHOPEDIC SURGERY | Age: 42
End: 2022-08-18

## 2022-08-18 ENCOUNTER — TELEPHONE (OUTPATIENT)
Dept: ORTHOPEDIC SURGERY | Age: 42
End: 2022-08-18

## 2022-08-18 VITALS — BODY MASS INDEX: 21.97 KG/M2 | WEIGHT: 140 LBS | HEIGHT: 67 IN

## 2022-08-18 DIAGNOSIS — S42.452A CLOSED FRACTURE OF CAPITELLUM OF DISTAL HUMERUS, LEFT, INITIAL ENCOUNTER: Primary | ICD-10-CM

## 2022-08-18 PROCEDURE — 99024 POSTOP FOLLOW-UP VISIT: CPT | Performed by: ORTHOPAEDIC SURGERY

## 2022-08-18 RX ORDER — CHLORZOXAZONE 500 MG/1
500 TABLET ORAL 4 TIMES DAILY PRN
Qty: 40 TABLET | Refills: 0 | Status: SHIPPED | OUTPATIENT
Start: 2022-08-18 | End: 2022-08-28

## 2022-08-18 NOTE — PROGRESS NOTES
HPI:   Patient follows up today s/p Left capitellum ORIF and ligamentous repair. She is now 5 weeks out. Complaints of stiffness. Previous order for no passive ROM past 30 degrees of extension and passive full flexion were made however her brace has been locked at 75 degrees. Physical Exam:  left elbow incision clean, dry, and intact  No signs of infection  ROM  today in office out of removable brace  NVI  Radial, median, and ulnar sensation intact to light touch  Brisk capillary refill  Muscle strength 5/5 grossly    Xrays: Views left elbow AP and lateral taken today in office reviewed with patient. Show continued stable fixation of previous capitellum of reduction internal fixation with Brock screws. [place] intact. Elbow well located. Impression: Stable fixation status post capitellum ORIF and ligamentous repair. Assessment:  Post-op Left capitellum ORIF, 5 weeks out. Elbow stiffness, flexion contracture    Plan:  Removable elbow brace placed today. Okay for full ROM. Okay for full passive and active ROM. Dynasplint ordered 2/2 stiffness. Follow up 4-6  weeks with x-rays  Okay to be out of brace for ROM exercises. Formal OT as outpatient if able. Vigorous ROM exercises. Aristeo Carcamo DO  8/18/2022      I have seen and evaluated the patient and agree with the above assessment and plan on today's visit. I have performed the key components of the history and physical examination with significant findings of postop left capitellum ORIF now with significant elbow stiffness particularly limiting elbow extension and supination. Dynamic splinting was recommended to improve elbow extension and supination. Discontinue hinged bracing. . I concur with the findings and plan as documented.     Katherine Feliciano MD  8/18/2022

## 2022-08-18 NOTE — TELEPHONE ENCOUNTER
Orders signed. Please see attached. Thank you.   Electronically signed by Yolette Robin PA-C on 8/18/2022 at 12:52 PM

## 2022-08-18 NOTE — TELEPHONE ENCOUNTER
Prior auth completed via Cover My Meds. Chlorzoxazone 500MG tablets is approved and no other action on Cover My Meds is required. Routed to providers for medication order.             Future Appointments   Date Time Provider Tata Josue   8/18/2022 10:40 AM Tawny Velez MD [facility]   8/31/2022 11:15 AM Helio Kennedy

## 2022-08-24 ENCOUNTER — EVALUATION (OUTPATIENT)
Dept: PHYSICAL THERAPY | Age: 42
End: 2022-08-24
Payer: OTHER GOVERNMENT

## 2022-08-24 DIAGNOSIS — S42.452D CLOSED FRACTURE OF CAPITELLUM OF DISTAL HUMERUS, LEFT, WITH ROUTINE HEALING, SUBSEQUENT ENCOUNTER: Primary | ICD-10-CM

## 2022-08-24 PROCEDURE — 97162 PT EVAL MOD COMPLEX 30 MIN: CPT | Performed by: PHYSICAL THERAPIST

## 2022-08-24 NOTE — PROGRESS NOTES
[address] [facility]   Phone: [phone]   Fax: [phone]      Physical Therapy Daily Treatment Note    Date: 2022  Patient Name: Nathalia Arevalo  : 1980   MRN: [id number]  DOInjury: 2022  DOSx: 2022   PROCEDURES PERFORMED:  1. Left capitellum open reduction internal fixation with morselized  cancellous allograft with Synthes 2.5 mm CCHS screws, supplemented with  a Synthes 2.0 mm CCHS screw. 2.  Lateral ulnar collateral ligament repair using a Mitek anchor. Referring Provider: MD Giovanni Knowles [address] [place] [address] Diagnosis:     U21.921J (ICD-10-CM) - Closed fracture of capitellum of distal humerus, left, initial encounter    ROM left elbow as tolerated, supination and pronation at wrist, stretching, modalities PRN      Outcome Measure:  Lizeth Cid 54.55%     S: See eval.   O: Pt given written HEP  Time 1642 - 4231      Visit   Repeat outcome measure at mid point and end.     Pain 2/10     ROM Joint/Motion:  Right Shoulder:  AROM: 180° Forward elevation,  80° ER,  IR to 60* abd 180 *        Left Shoulder:  AROM: 160° Forward elevation,  50° ER,  IR to 80, (er/ir tested at 30*)  abd 125        Joint/Motion:  Right Elbow:  AROM: 155° flexion, extension hyperextends 5 °     Pronation 90*  Supination 120*     Right Wrist:   AROM: extension: 85°, flexion 65°, ulnar deviation: 45°, radial deviation: 25°     Left ELbow:  AROM: 105° flexion, extension lacks 75°         Pronation 35*  Supination 65*     Left Wrist:   AROM: extension: 5* from neutral °, flexion 55°, ulnar deviation: 28°, radial deviation: 12°        Modalities            Manual                  Stretch      Table slides      Wall Flexion      Wall ER stretch      Towel IR stretch      IR reaching behind back      Exercise      Shrugs AROM      Pendulum Ex      UBE      Pulleys - flex      Pulleys-IR      Supine wand chest press      Supine wand flex      Supine wand ER/IR      Supine flexion      S-lying ABD      S-lying ER      Standing wand flex      Standing flexion      Standing ABD            ROWS: H Functional activities  To aid in ROM and strength needed for reaching , lifting ,pushing and pulling at home/work    ROWS: M  \"    ROWS: L  \"    ER  \"    IR  \"                A:  Tolerated well.     P: Continue with rehab plan  Yaron Vaughan, PT DPT, PT [id number]    Treatment Charges: Mins Units   Initial Evaluation 39 1   Re-Evaluation     Ther Exercise         TE     Manual Therapy     MT     Ther Activities        TA     Gait Training          GT     Neuro Re-education NR     Modalities     Non-Billable Service Time     Other     Total Time/Units 39 1
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
## 2022-08-26 ENCOUNTER — TREATMENT (OUTPATIENT)
Dept: PHYSICAL THERAPY | Age: 42
End: 2022-08-26
Payer: OTHER GOVERNMENT

## 2022-08-26 DIAGNOSIS — S42.452D CLOSED FRACTURE OF CAPITELLUM OF DISTAL HUMERUS, LEFT, WITH ROUTINE HEALING, SUBSEQUENT ENCOUNTER: Primary | ICD-10-CM

## 2022-08-26 PROCEDURE — 97110 THERAPEUTIC EXERCISES: CPT | Performed by: PHYSICAL THERAPIST

## 2022-08-26 PROCEDURE — 97140 MANUAL THERAPY 1/> REGIONS: CPT | Performed by: PHYSICAL THERAPIST

## 2022-08-26 NOTE — PROGRESS NOTES
[address] [facility]   Phone: [phone]   Fax: [phone]      Physical Therapy Daily Treatment Note    Date: 2022  Patient Name: Oleg Diaz  : 1980   MRN: [id number]  DOInjury: 2022  DOSx: 2022   PROCEDURES PERFORMED:  1. Left capitellum open reduction internal fixation with morselized  cancellous allograft with Synthes 2.5 mm CCHS screws, supplemented with  a Synthes 2.0 mm CCHS screw. 2.  Lateral ulnar collateral ligament repair using a Mitek anchor. Referring Provider: No referring provider defined for this encounter. Medical Diagnosis:     S42.452A (ICD-10-CM) - Closed fracture of capitellum of distal humerus, left, initial encounter    ROM left elbow as tolerated, supination and pronation at wrist, stretching, modalities PRN      Outcome Measure:  Yenni Queens 54.55%     S: pt reports /10 pain today. O:  Time 0840- 0920      Visit   Repeat outcome measure at mid point and end.     Pain See above      ROM Joint/Motion:  Right Shoulder:  AROM: 180° Forward elevation,  80° ER,  IR to 60* abd 180 *        Left Shoulder:  AROM: 160° Forward elevation,  50° ER,  IR to 80, (er/ir tested at 30*)  abd 125        Joint/Motion:  Right Elbow:  AROM: 155° flexion, extension hyperextends 5 °     Pronation 90*  Supination 120*     Right Wrist:   AROM: extension: 85°, flexion 65°, ulnar deviation: 45°, radial deviation: 25°     Left ELbow:  AROM: 105° flexion, extension lacks 75°         Pronation 35*  Supination 65*     Left Wrist:   AROM: extension: 5* from neutral °, flexion 55°, ulnar deviation: 28°, radial deviation: 12°        Modalities            Manual      PROM/ stretching shoulder, elbow, wrist  15 minutes           Stretch      Table slides      Wall Flexion      Wall ER stretch      Towel IR stretch      IR reaching behind back      Exercise      Shrugs AROM      Pendulum Ex      UBE      Pulleys - flex      Pulleys-IR      Supine wand chest press      Supine wand flex      Supine wand ER/IR      Supine flexion      S-lying ABD      Elbow flexion/extension seated  20x 3s hold  HEP    Pronation / supination AROM and AAROM with clasped hands   20x 3s holds  HEP    Supine elbow extension stretch with cuff weight 1 # and towel under elbow  3 minutes      Clasped hand wrist flexion/extension with 3s hold for stretch 20x  HEP                ROWS: H Functional activities  To aid in ROM and strength needed for reaching , lifting ,pushing and pulling at home/work    ROWS: M  \"    ROWS: L  \"    ER  \"    IR  \"                A:  Tolerated well. Pt given HEP as above along with supine elbow flexion extension with stretch into extension.       P: Continue with rehab plan  Kurt Carreon, PT DPT, PT [id number]    Treatment Charges: Mins Units   Initial Evaluation     Re-Evaluation     Ther Exercise         TE 25 2   Manual Therapy     MT 15 1   Ther Activities        TA     Gait Training          GT     Neuro Re-education NR     Modalities     Non-Billable Service Time     Other     Total Time/Units 40 3

## 2022-08-30 ENCOUNTER — TREATMENT (OUTPATIENT)
Dept: PHYSICAL THERAPY | Age: 42
End: 2022-08-30
Payer: OTHER GOVERNMENT

## 2022-08-30 DIAGNOSIS — S42.452D CLOSED FRACTURE OF CAPITELLUM OF DISTAL HUMERUS, LEFT, WITH ROUTINE HEALING, SUBSEQUENT ENCOUNTER: Primary | ICD-10-CM

## 2022-08-30 PROCEDURE — 97110 THERAPEUTIC EXERCISES: CPT

## 2022-08-30 PROCEDURE — 97140 MANUAL THERAPY 1/> REGIONS: CPT

## 2022-08-30 NOTE — PROGRESS NOTES
[facility]   Phone: [phone]   Fax: [phone]      Physical Therapy Treatment Note    Date: 2022  Patient Name: Ronnie Huber  : 1980   MRN: [id number]  DOInjury: 2022  DOSx: 2022   PROCEDURES PERFORMED:  1. Left capitellum open reduction internal fixation with morselized  cancellous allograft with Synthes 2.5 mm CCHS screws, supplemented with  a Synthes 2.0 mm CCHS screw. 2.  Lateral ulnar collateral ligament repair using a Mitek anchor. Referring Provider: MD Giovanni Ortiz [address] [place] [address]     Medical Diagnosis:     A98.886R (ICD-10-CM) - Closed fracture of capitellum of distal humerus, left, initial encounter    ROM left elbow as tolerated, supination and pronation at wrist, stretching, modalities PRN      Outcome Measure:  Lino Roys 54.55%     S: The pt reported she was able to scratch her head with her L hand and able to manage her WC with her L hand for the first time, believes strength and ROM are both improving in the LUE.    O:  Time 6014-1402     Visit 3/16  Repeat outcome measure at mid point and end.     Pain See above      ROM Joint/Motion:  Right Shoulder:  AROM: 180° Forward elevation,  80° ER,  IR to 60* abd 180 *        Left Shoulder:  AROM: 160° Forward elevation,  50° ER,  IR to 80, (er/ir tested at 30*)  abd 125        Joint/Motion:  Right Elbow:  AROM: 155° flexion, extension hyperextends 5 °     Pronation 90*  Supination 120*     Right Wrist:   AROM: extension: 85°, flexion 65°, ulnar deviation: 45°, radial deviation: 25°     Left ELbow:  AROM: 105° flexion, extension lacks 75°         Pronation 35*  Supination 65*     Left Wrist:   AROM: extension: 5* from neutral °, flexion 55°, ulnar deviation: 28°, radial deviation: 12°        Modalities            Manual      PROM/ stretching shoulder, elbow, wrist  15 minutes  MT         Stretch      Table slides      Wall Flexion      Wall ER stretch      Towel IR stretch      IR reaching behind back      Exercise      Shrugs AROM      Pendulum Ex      UBE      Pulleys - flex X20 5s holds  TE   Pulleys-IR      Supine wand chest press      Supine wand flex      Supine wand ER/IR      Supine flexion      S-lying ABD      Elbow flexion/extension seated  20x 3s hold  HEP    Pronation / supination AROM and AAROM with clasped hands   20x 3s holds  HEP    Supine elbow extension stretch with cuff weight 3 # and towel under elbow  4 minutes      Clasped hand wrist flexion/extension with 3s hold for stretch 20x 5s holds HEP                ROWS: H Functional activities  To aid in ROM and strength needed for reaching , lifting ,pushing and pulling at home/work    ROWS: M  \"    ROWS: L  \"    ER  \"    IR  \"                A:  The pt was able to perform exercises well, emphasis on elbow extension, shoulder ROM is good and nearly symmetric to RUE, elbow remains limited in flexion and extension, wrist is more limited in extension than flexion,  strength is improving per pt. P: Continue with rehab plan    Daxa Ratliff Friday, PT  License number:  [id number]     Treatment Charges: Mins Units   Initial Evaluation     Re-Evaluation     Ther Exercise         TE 25 2   Manual Therapy     MT 15 1   Ther Activities        TA     Gait Training          GT     Neuro Re-education NR     Modalities     Non-Billable Service Time     Other     Total Time/Units 40 3

## 2022-08-31 ENCOUNTER — OFFICE VISIT (OUTPATIENT)
Dept: ORTHOPEDIC SURGERY | Age: 42
End: 2022-08-31
Payer: OTHER GOVERNMENT

## 2022-08-31 DIAGNOSIS — S32.810D CLOSED PELVIC RING FRACTURE WITH ROUTINE HEALING, SUBSEQUENT ENCOUNTER: Primary | ICD-10-CM

## 2022-08-31 PROCEDURE — 99024 POSTOP FOLLOW-UP VISIT: CPT | Performed by: ORTHOPAEDIC SURGERY

## 2022-08-31 PROCEDURE — 99212 OFFICE O/P EST SF 10 MIN: CPT

## 2022-08-31 NOTE — PROGRESS NOTES
Rocale Quiñonez is a 36 y.o. female who presents for follow up of Mild Increased displaced Left Pubic Bone. SURGEON: Dr. Deirdre Apgar, MD  Date of Injury/Surgery:  2022  Date last seen in office:  2022    Symptoms: better  New complaints: Pt stated the Pelvis feels better. Pt expressed having increased radiating back pain starting at the Lower back and moving proximal to the cervical spine. Pt noted having difficulty being seated for long periods of time. Pt denied having any crepitus in the left pelvis bone. Pt has numbness and tingling on the posterior aspect of the left pelvis. Weightbearing: left lower Non-weight bearing      Assistive device Wheelchair  Participating in therapy (location if yes)? yes, [facility] ([facility]).      Refills Needed: None  Order/Referral Needed: N/A

## 2022-09-01 ENCOUNTER — TREATMENT (OUTPATIENT)
Dept: PHYSICAL THERAPY | Age: 42
End: 2022-09-01
Payer: OTHER GOVERNMENT

## 2022-09-01 DIAGNOSIS — S42.452D CLOSED FRACTURE OF CAPITELLUM OF DISTAL HUMERUS, LEFT, WITH ROUTINE HEALING, SUBSEQUENT ENCOUNTER: Primary | ICD-10-CM

## 2022-09-01 PROCEDURE — 97110 THERAPEUTIC EXERCISES: CPT

## 2022-09-01 PROCEDURE — 97140 MANUAL THERAPY 1/> REGIONS: CPT

## 2022-09-01 NOTE — PROGRESS NOTES
[facility]   Phone: [phone]   Fax: [phone]      Physical Therapy Treatment Note    Date: 2022  Patient Name: Tony Marroquin  : 1980   MRN: [id number]  DOInjury: 2022  DOSx: 2022   PROCEDURES PERFORMED:  1. Left capitellum open reduction internal fixation with morselized  cancellous allograft with Synthes 2.5 mm CCHS screws, supplemented with  a Synthes 2.0 mm CCHS screw. 2.  Lateral ulnar collateral ligament repair using a Mitek anchor. Referring Provider: MD Giovanni Yang [address] [place] [address]     Medical Diagnosis:     L29.036I (ICD-10-CM) - Closed fracture of capitellum of distal humerus, left, initial encounter    ROM left elbow as tolerated, supination and pronation at wrist, stretching, modalities PRN      Outcome Measure:  Quickdash 54.55%     S: The pt is pleased with her perceived improvement in L elbow ROM, states she has received new pain medication. O:  Time 5429-9802     Visit   Repeat outcome measure at mid point and end.     Pain      ROM Joint/Motion:  Right Shoulder:  AROM: 180° Forward elevation,  80° ER,  IR to 60* abd 180 *        Left Shoulder:  AROM: 160° Forward elevation,  50° ER,  IR to 80, (er/ir tested at 30*)  abd 125        Joint/Motion:  Right Elbow:  AROM: 155° flexion, extension hyperextends 5 °     Pronation 90*  Supination 120*     Right Wrist:   AROM: extension: 85°, flexion 65°, ulnar deviation: 45°, radial deviation: 25°     Left ELbow:  AROM: 105° flexion, extension lacks 75°         Pronation 35*  Supination 65*     Left Wrist:   AROM: extension: 5* from neutral °, flexion 55°, ulnar deviation: 28°, radial deviation: 12°        Modalities            Manual      PROM/ stretching shoulder, elbow, wrist  18 minutes  MT   STM to L elbow mm, biceps brachii., brachialis X5 min  MT         Stretch      Table slides      Wall Flexion      Wall ER stretch      Towel IR stretch      IR reaching behind back      Exercise      Shrugs AROM      Pendulum Ex      UBE      Pulleys - flex X20 5s holds  TE   Pulleys-IR      Supine wand chest press      Supine wand flex      Supine wand ER/IR      Supine flexion      S-lying ABD      Elbow flexion/extension seated  20x 3s hold  HEP TE   Pronation / supination AROM and AAROM with clasped hands   20x 3s holds  HEP TE   Supine elbow extension stretch with cuff weight 3 # and towel under elbow  4 minutes   TE   Clasped hand wrist flexion/extension with 5s hold for stretch 20x 5s holds HEP TE               ROWS: H Functional activities  To aid in ROM and strength needed for reaching , lifting ,pushing and pulling at home/work    ROWS: M  \"    ROWS: L  \"    ER  \"    IR  \"                A:  The pt's L elbow ROM pre session was lacking 59°, post session it was lacking 54°. The pt is pleased with her progress and objective measurements that indicate her improved L elbow ROM. P: Continue with rehab plan    Sadia Wiley.  Sandie Espino, PT  License number:  [id number]     Treatment Charges: Mins Units   Initial Evaluation     Re-Evaluation     Ther Exercise         TE 17 1   Manual Therapy     MT 23 2   Ther Activities        TA     Gait Training          GT     Neuro Re-education NR     Modalities     Non-Billable Service Time     Other     Total Time/Units 40 3

## 2022-09-07 ENCOUNTER — TREATMENT (OUTPATIENT)
Dept: PHYSICAL THERAPY | Age: 42
End: 2022-09-07
Payer: OTHER GOVERNMENT

## 2022-09-07 DIAGNOSIS — S42.452D CLOSED FRACTURE OF CAPITELLUM OF DISTAL HUMERUS, LEFT, WITH ROUTINE HEALING, SUBSEQUENT ENCOUNTER: Primary | ICD-10-CM

## 2022-09-07 PROCEDURE — 97140 MANUAL THERAPY 1/> REGIONS: CPT

## 2022-09-07 PROCEDURE — 97110 THERAPEUTIC EXERCISES: CPT

## 2022-09-07 NOTE — PROGRESS NOTES
[address] [facility]   Phone: [phone]   Fax: [phone]      Physical Therapy Treatment Note    Date: 2022  Patient Name: Yeni Tellez  : 1980   MRN: [id number]  DOInjury: 2022  DOSx: 2022   PROCEDURES PERFORMED:  1. Left capitellum open reduction internal fixation with morselized  cancellous allograft with Synthes 2.5 mm CCHS screws, supplemented with  a Synthes 2.0 mm CCHS screw. 2.  Lateral ulnar collateral ligament repair using a Mitek anchor. Referring Provider: No referring provider defined for this encounter. Medical Diagnosis:     S42.452A (ICD-10-CM) - Closed fracture of capitellum of distal humerus, left, initial encounter    ROM left elbow as tolerated, supination and pronation at wrist, stretching, modalities PRN      Outcome Measure:  Quickdash 54.55%     S: Pt with slight pain and reports improved mobility. O:  Time [id number]     Visit   Repeat outcome measure at mid point and end.     Pain      ROM Joint/Motion:  Right Shoulder:  AROM: 180° Forward elevation,  80° ER,  IR to 60* abd 180 *        Left Shoulder:  AROM: 160° Forward elevation,  50° ER,  IR to 80, (er/ir tested at 30*)  abd 125        Joint/Motion:  Right Elbow:  AROM: 155° flexion, extension hyperextends 5 °     Pronation 90*  Supination 120*     Right Wrist:   AROM: extension: 85°, flexion 65°, ulnar deviation: 45°, radial deviation: 25°     Left ELbow:  AROM: 105° flexion, extension lacks 35°         Pronation 35*  Supination 65*     Left Wrist:   AROM: extension: 5* from neutral °, flexion 55°, ulnar deviation: 28°, radial deviation: 12°        Modalities            Manual      PROM/ stretching shoulder, elbow, wrist  15 minutes  MT   STM to L elbow mm, biceps brachii., brachialis X5 min  MT         Stretch      Table slides      Wall Flexion      Wall ER stretch      Towel IR stretch      IR reaching behind back      Exercise      Shrugs AROM      Pendulum Ex

## 2022-09-09 ENCOUNTER — TREATMENT (OUTPATIENT)
Dept: PHYSICAL THERAPY | Age: 42
End: 2022-09-09
Payer: OTHER GOVERNMENT

## 2022-09-09 DIAGNOSIS — S42.452D CLOSED FRACTURE OF CAPITELLUM OF DISTAL HUMERUS, LEFT, WITH ROUTINE HEALING, SUBSEQUENT ENCOUNTER: Primary | ICD-10-CM

## 2022-09-09 PROCEDURE — 97140 MANUAL THERAPY 1/> REGIONS: CPT

## 2022-09-09 PROCEDURE — 97110 THERAPEUTIC EXERCISES: CPT

## 2022-09-09 NOTE — PROGRESS NOTES
[address] [facility]   Phone: [phone]   Fax: [phone]      Physical Therapy Treatment Note    Date: 2022  Patient Name: Alexei Parry  : 1980   MRN: [id number]  DOInjury: 2022  DOSx: 2022   PROCEDURES PERFORMED:  1. Left capitellum open reduction internal fixation with morselized  cancellous allograft with Synthes 2.5 mm CCHS screws, supplemented with  a Synthes 2.0 mm CCHS screw. 2.  Lateral ulnar collateral ligament repair using a Mitek anchor. Referring Provider: MD Giovanni Mckeon [address] [place] [address]     Medical Diagnosis:     C24.382H (ICD-10-CM) - Closed fracture of capitellum of distal humerus, left, initial encounter    ROM left elbow as tolerated, supination and pronation at wrist, stretching, modalities PRN      Outcome Measure:  Quickdash 54.55%     S: Pt reports soreness following last treatment. Reports she feels as though ROM is improving. O:  Time 7865-9972     Visit   Repeat outcome measure at mid point and end. Pain      ROM Joint/Motion:  Right Shoulder:  AROM: 180° Forward elevation,  80° ER,  IR to 60* abd 180 *        Left Shoulder:  AROM: 160° Forward elevation,  50° ER,  IR to 80, (er/ir tested at 30*)  abd 125        Joint/Motion:  Right Elbow:  AROM: 155° flexion, extension hyperextends 5 °     Pronation 90*  Supination 120*     Right Wrist:   AROM: extension: 85°, flexion 65°, ulnar deviation: 45°, radial deviation: 25°     Left ELbow:  AROM: 115° flexion, extension lacks 25°         Pronation 35*  Supination 65*     Left Wrist:   AROM: extension: 5* from neutral °, flexion 55°, ulnar deviation: 28°, radial deviation: 12°        Modalities            Manual      PROM/ stretching shoulder, elbow, wrist with contract relax of the bicep and over pressure.   15 minutes  MT   STM to L elbow mm, biceps brachii., brachialis X5 min  MT         Stretch      Table slides      Wall Flexion      Wall ER stretch Towel IR stretch      IR reaching behind back      Exercise      Shrugs AROM      Pendulum Ex      UBE      Pulleys - flex X20 5s holds  TE   Pulleys-IR      Supine wand chest press      Supine wand flex      Supine er       Supine flexion      S-lying ABD      Elbow flexion/extension seated  20x 3s hold  HEP TE   Pronation / supination AROM and AAROM with clasped hands   20x 3s holds  HEP TE   Supine elbow extension stretch with cuff weight 3 # and towel under elbow  4 minutes  Reports pain therefore less time this session TE   Clasped hand wrist flexion/extension with 5s hold for stretch 20x 5s holds HEP TE               ROWS: H Functional activities  To aid in ROM and strength needed for reaching , lifting ,pushing and pulling at home/work    ROWS: M  \"    ROWS: L  \"    ER  \"    IR  \"                A:  The pt's L elbow ROM was lacking 25 degrees this date. Pt is happy with progress in ROM thus far.      P: Continue with rehab plan    Amos Mota [id number]     Treatment Charges: Mins Units   Initial Evaluation     Re-Evaluation     Ther Exercise         TE 15 1   Manual Therapy     MT 20 1   Ther Activities        TA     Gait Training          GT     Neuro Re-education NR     Modalities     Non-Billable Service Time     Other     Total Time/Units 35 2

## 2022-09-13 ENCOUNTER — TREATMENT (OUTPATIENT)
Dept: PHYSICAL THERAPY | Age: 42
End: 2022-09-13
Payer: OTHER GOVERNMENT

## 2022-09-13 DIAGNOSIS — S42.452D CLOSED FRACTURE OF CAPITELLUM OF DISTAL HUMERUS, LEFT, WITH ROUTINE HEALING, SUBSEQUENT ENCOUNTER: Primary | ICD-10-CM

## 2022-09-13 PROCEDURE — 97140 MANUAL THERAPY 1/> REGIONS: CPT | Performed by: PHYSICAL THERAPIST

## 2022-09-13 PROCEDURE — 97110 THERAPEUTIC EXERCISES: CPT | Performed by: PHYSICAL THERAPIST

## 2022-09-13 NOTE — PROGRESS NOTES
[address] [facility]   Phone: [phone]   Fax: [phone]      Physical Therapy Treatment Note    Date: 2022  Patient Name: Rocael Quiñonez  : 1980   MRN: [id number]  DOInjury: 2022  DOSx: 2022   PROCEDURES PERFORMED:  1. Left capitellum open reduction internal fixation with morselized  cancellous allograft with Synthes 2.5 mm CCHS screws, supplemented with  a Synthes 2.0 mm CCHS screw. 2.  Lateral ulnar collateral ligament repair using a Mitek anchor. Referring Provider: MD Giovanni Bustamante [address] [place] [address]     Medical Diagnosis:     N83.439F (ICD-10-CM) - Closed fracture of capitellum of distal humerus, left, initial encounter    ROM left elbow as tolerated, supination and pronation at wrist, stretching, modalities PRN      Outcome Measure:  Katkrista Karma 54.55%     S: Pt reports 2/10 pain today. O:  Time 1233-3351     Visit   Repeat outcome measure at mid point and end. Pain      ROM Joint/Motion:  Right Shoulder:  AROM: 180° Forward elevation,  80° ER,  IR to 60* abd 180 *        Left Shoulder:  AROM: 160° Forward elevation,  50° ER,  IR to 80, (er/ir tested at 30*)  abd 125        Joint/Motion:  Right Elbow:  AROM: 155° flexion, extension hyperextends 5 °     Pronation 90*  Supination 120*     Right Wrist:   AROM: extension: 85°, flexion 65°, ulnar deviation: 45°, radial deviation: 25°     Left ELbow:  AROM: 115° flexion, extension lacks 25°         Pronation 35*  Supination 65*     Left Wrist:   AROM: extension: 5* from neutral °, flexion 55°, ulnar deviation: 28°, radial deviation: 12°        Modalities            Manual      PROM/ stretching shoulder, elbow, wrist with contract relax of the bicep and over pressure.   15 minutes  MT   STM to L elbow mm, biceps brachii., brachialis X10  min  MT         Stretch      Table slides      Wall Flexion      Wall ER stretch      Towel IR stretch      IR reaching behind back Exercise      Shrugs AROM      Pendulum Ex      UBE      Pulleys - flex  TE   Pulleys-IR      Supine wand chest press      Supine wand flex      Supine er       Supine flexion      S-lying ABD      Elbow flexion/extension seated  20x 3s hold  HEP TE   Pronation / supination AROM and AAROM with clasped hands   20x 3s holds  HEP TE   Supine elbow extension stretch with cuff weight 3 # and towel under elbow  4 minutes  Reports pain therefore less time this session TE   Clasped hand wrist flexion/extension with 5s hold for stretch 20x 5s holds HEP TE               ROWS: H Functional activities  To aid in ROM and strength needed for reaching , lifting ,pushing and pulling at home/work    ROWS: M  \"    ROWS: L  \"    ER  \"    IR  \"                A:  The pt's L elbow PROM extension was lacking 18  degrees this date.     P: Continue with rehab plan    [place] Awlacey PT [id number]    Treatment Charges: Mins Units   Initial Evaluation     Re-Evaluation     Ther Exercise         TE 15 1   Manual Therapy     MT 25 2   Ther Activities        TA     Gait Training          GT     Neuro Re-education NR     Modalities     Non-Billable Service Time     Other     Total Time/Units 40  3

## 2022-09-15 ENCOUNTER — TREATMENT (OUTPATIENT)
Dept: PHYSICAL THERAPY | Age: 42
End: 2022-09-15
Payer: OTHER GOVERNMENT

## 2022-09-15 DIAGNOSIS — S42.452D CLOSED FRACTURE OF CAPITELLUM OF DISTAL HUMERUS, LEFT, WITH ROUTINE HEALING, SUBSEQUENT ENCOUNTER: Primary | ICD-10-CM

## 2022-09-15 PROCEDURE — 97110 THERAPEUTIC EXERCISES: CPT

## 2022-09-15 PROCEDURE — 97140 MANUAL THERAPY 1/> REGIONS: CPT

## 2022-09-15 NOTE — PROGRESS NOTES
[address] [facility]   Phone: [phone]   Fax: [phone]      Physical Therapy Treatment Note    Date: 9/15/2022  Patient Name: Gunner Verduzco  : 1980   MRN: [id number]  DOInjury: 2022  DOSx: 2022   PROCEDURES PERFORMED:  1. Left capitellum open reduction internal fixation with morselized  cancellous allograft with Synthes 2.5 mm CCHS screws, supplemented with  a Synthes 2.0 mm CCHS screw. 2.  Lateral ulnar collateral ligament repair using a Mitek anchor. Referring Provider: MD Giovanni Boucher [address] [place] [address]     Medical Diagnosis:     D02.738Y (ICD-10-CM) - Closed fracture of capitellum of distal humerus, left, initial encounter    ROM left elbow as tolerated, supination and pronation at wrist, stretching, modalities PRN      Outcome Measure:  Quickdash 54.55%     S: Pt reports /10 pain today. O:  Time 6625-7528     Visit   Repeat outcome measure at mid point and end. Pain      ROM Joint/Motion:  Right Shoulder:  AROM: 180° Forward elevation,  80° ER,  IR to 60* abd 180 *        Left Shoulder:  AROM: 160° Forward elevation,  50° ER,  IR to 80, (er/ir tested at 30*)  abd 125        Joint/Motion:  Right Elbow:  AROM: 155° flexion, extension hyperextends 5 °     Pronation 90*  Supination 120*     Right Wrist:   AROM: extension: 85°, flexion 65°, ulnar deviation: 45°, radial deviation: 25°     Left ELbow:  AROM: 115° flexion, extension lacks 18°         Pronation 35*  Supination 65*     Left Wrist:   AROM: extension: 5* from neutral °, flexion 55°, ulnar deviation: 28°, radial deviation: 12°        Modalities      ICE 10 min. Manual      PROM/ stretching shoulder, elbow, wrist with contract relax of the bicep and over pressure.   10 minutes  MT   STM to L elbow mm, biceps brachii., brachialis X10  min  MT         Stretch      Table slides      Wall Flexion      Wall ER stretch      Towel IR stretch      IR reaching behind back      Exercise      Shrugs AROM      Pendulum Ex      UBE      Pulleys - flex  TE   Pulleys-IR      Supine wand chest press      Supine wand flex      Supine er       Supine flexion      S-lying ABD      Elbow flexion/extension seated  20x 3s hold  HEP TE   Pronation / supination AROM and AAROM with clasped hands   20x 3s holds  HEP TE   Supine elbow extension stretch with cuff weight 2# and towel under elbow  5 minutes  Reports pain therefore less time this session TE   Clasped hand wrist flexion/extension with 5s hold for stretch 20x 5s holds HEP TE               ROWS: H Functional activities  To aid in ROM and strength needed for reaching , lifting ,pushing and pulling at home/work    ROWS: M  \"    ROWS: L  \"    ER  \"    IR  \"                A:  The pt's L elbow PROM extension was lacking 18  degrees this date.     P: Continue with rehab plan    Ruth Ann Deras PTA [id number]     Treatment Charges: Mins Units   Initial Evaluation     Re-Evaluation     Ther Exercise         TE 10 1   Manual Therapy     MT 20 1   Ther Activities        TA     Gait Training          GT     Neuro Re-education NR     Modalities     Non-Billable Service Time 10    Other     Total Time/Units 40  2

## 2022-09-16 DIAGNOSIS — S32.810D CLOSED PELVIC RING FRACTURE WITH ROUTINE HEALING, SUBSEQUENT ENCOUNTER: Primary | ICD-10-CM

## 2022-09-19 ENCOUNTER — OFFICE VISIT (OUTPATIENT)
Dept: ORTHOPEDIC SURGERY | Age: 42
End: 2022-09-19

## 2022-09-19 VITALS — RESPIRATION RATE: 20 BRPM | HEIGHT: 67 IN | WEIGHT: 140 LBS | BODY MASS INDEX: 21.97 KG/M2

## 2022-09-19 DIAGNOSIS — S42.452A CLOSED FRACTURE OF CAPITELLUM OF DISTAL HUMERUS, LEFT, INITIAL ENCOUNTER: Primary | ICD-10-CM

## 2022-09-19 PROCEDURE — 99024 POSTOP FOLLOW-UP VISIT: CPT | Performed by: ORTHOPAEDIC SURGERY

## 2022-09-20 ENCOUNTER — OFFICE VISIT (OUTPATIENT)
Dept: ORTHOPEDIC SURGERY | Age: 42
End: 2022-09-20
Payer: OTHER GOVERNMENT

## 2022-09-20 DIAGNOSIS — S32.810D CLOSED PELVIC RING FRACTURE WITH ROUTINE HEALING, SUBSEQUENT ENCOUNTER: Primary | ICD-10-CM

## 2022-09-20 PROCEDURE — 99213 OFFICE O/P EST LOW 20 MIN: CPT | Performed by: PHYSICIAN ASSISTANT

## 2022-09-20 NOTE — PROGRESS NOTES
Chief Complaint   Patient presents with    Follow Up After Procedure     Closed pelvic ring fx. . 3 wk FU. . repeat XR & hopeful to advance WB status. . Pt states it's feeling good. . she is ready to start walking         Subjective:  Clover Millard is approximately 10 weeks from original date of injury of her closed left superior and inferior rami fractures. She has been nonweightbearing to the left lower extremity. Does not have a walker, presents with a wheelchair. States that she started physical therapy at [facility] in [address]. States that she has occasional, intermittent sharp pain near her left groin that resolved spontaneously. Denies paresthesias. Denies bowel or bladder dysfunction. Denies calf pain, chest pain, shortness of breath. Review of Systems -  all pertinent positives and negatives in HPI. Objective:    General: Alert and oriented X 3, normocephalic atraumatic, external ears and eye normal, sclera clear, no acute distress, respirations easy and unlabored with no audible wheezes, skin warm and dry, speech and dress appropriate for noted age, affect euthymic. Extremity:  Left Lower Extremity  Skin clean dry and intact, without signs of infection  Nontender throughout the L LE, nontender to passive ER and IR of the hip and nontender to log roll  no edema noted  Compartments supple throughout thigh and leg  Calf supple and nontender  Demonstrates active knee flexion/extension, ankle plantar/dorsiflexion/great toe extension. States sensation intact to touch in sural/deep peroneal/superficial peroneal/saphenous/posterior tibial nerve distributions to foot/ankle. Palpable dorsalis pedis and posterior tibialis pulses, cap refill brisk in toes, foot warm/perfused. XR:   3 views of L hip demonstrating interval healing of L rami fractures. No significant change in alignment.  No acute fractures or dislocations or any other osseus abnormality identified. Assessment:   Diagnosis Orders   1. Closed pelvic ring fracture with routine healing, subsequent encounter  Misc. Devices MISC    Amb External Referral To Physical Therapy    XR PELVIS (MIN 3 VIEWS)          Plan:  Reviewed x-rays with patient today in office   Can start progressive weightbearing with assistive devices left lower extremity advance 25% body weight per week as tolerable. Continue PT, updated PT prescription faxed  Over-the-counter analgesics as needed, RICE therapy. Follow up in 4 weeks with XR of the pelvis with inlet and outlet views     Electronically signed by Danilo Rosario PA-C on 9/20/2022 at 10:07 AM  Note: This report was completed using [employer]' voiced recognition software. Every effort has been made to ensure accuracy; however, inadvertent computerized transcription errors may be present.

## 2022-09-22 ENCOUNTER — TREATMENT (OUTPATIENT)
Dept: PHYSICAL THERAPY | Age: 42
End: 2022-09-22

## 2022-09-22 DIAGNOSIS — S42.452D CLOSED FRACTURE OF CAPITELLUM OF DISTAL HUMERUS, LEFT, WITH ROUTINE HEALING, SUBSEQUENT ENCOUNTER: Primary | ICD-10-CM

## 2022-10-18 ENCOUNTER — OFFICE VISIT (OUTPATIENT)
Dept: ORTHOPEDIC SURGERY | Age: 42
End: 2022-10-18
Payer: OTHER GOVERNMENT

## 2022-10-18 DIAGNOSIS — S32.810D CLOSED PELVIC RING FRACTURE WITH ROUTINE HEALING, SUBSEQUENT ENCOUNTER: Primary | ICD-10-CM

## 2022-10-18 PROCEDURE — 99213 OFFICE O/P EST LOW 20 MIN: CPT | Performed by: PHYSICIAN ASSISTANT

## 2022-10-18 RX ORDER — ERGOCALCIFEROL 1.25 MG/1
50000 CAPSULE ORAL WEEKLY
Qty: 12 CAPSULE | Refills: 1 | Status: SHIPPED | OUTPATIENT
Start: 2022-10-18

## 2022-10-18 NOTE — PROGRESS NOTES
L ramus fracture appearing unchanged without much healing appreciated. No significant change in alignment. No acute fractures or dislocations or any other osseus abnormality identified. Assessment:   Diagnosis Orders   1. Closed pelvic ring fracture with routine healing, subsequent encounter  vitamin D (ERGOCALCIFEROL) 1.25 MG (57851 UT) CAPS capsule    XR PELVIS (MIN 3 VIEWS)          Plan:  Reviewed x-rays with patient today in office   WBAT L LE  Continue PT and HEP  Stop home vitamin D supplement, start prescribed 50,000 units once weekly. Continue adequate calcium and protein intake      Follow up in 2-3 months with XR of the pelvis with inlet and outlet view    Electronically signed by Jessy Cueto PA-C on 10/18/2022 at 8:50 AM  Note: This report was completed using computerize voiced recognition software. Every effort has been made to ensure accuracy; however, inadvertent computerized transcription errors may be present.

## 2022-12-16 ENCOUNTER — TELEPHONE (OUTPATIENT)
Dept: ORTHOPEDIC SURGERY | Age: 42
End: 2022-12-16

## 2022-12-16 DIAGNOSIS — S32.810D CLOSED PELVIC RING FRACTURE WITH ROUTINE HEALING, SUBSEQUENT ENCOUNTER: Primary | ICD-10-CM

## 2022-12-16 DIAGNOSIS — S42.452A CLOSED FRACTURE OF CAPITELLUM OF DISTAL HUMERUS, LEFT, INITIAL ENCOUNTER: ICD-10-CM

## 2022-12-19 ENCOUNTER — OFFICE VISIT (OUTPATIENT)
Dept: ORTHOPEDIC SURGERY | Age: 42
End: 2022-12-19
Payer: OTHER GOVERNMENT

## 2022-12-19 VITALS — HEIGHT: 67 IN | BODY MASS INDEX: 21.97 KG/M2 | WEIGHT: 140 LBS

## 2022-12-19 DIAGNOSIS — M24.522 CONTRACTURE, LEFT ELBOW: Primary | ICD-10-CM

## 2022-12-19 PROCEDURE — 99212 OFFICE O/P EST SF 10 MIN: CPT | Performed by: ORTHOPAEDIC SURGERY

## 2022-12-19 NOTE — PROGRESS NOTES
Chief Complaint   Patient presents with    Follow Up After Procedure     5 months out. Left capitellum ORIF, Lateral ulnar collateral ligament repair           Charo Pappas is a 36y.o. year old  who presents for follow up of left elbow 5 months postop with improving contracture. Biggest complaint is continued stiffness limiting elbow flexion. She reports her elbow extension is improving. She is still using the extension Dynasplint. She is inquiring about possibly a Dynasplint for elbow flexion. Past Medical History:   Diagnosis Date    HPV (human papilloma virus) anogenital infection     Positive    Hypothyroidism     Incontinence     Urge    Inguinal hernia 03/2013    Bilateral Repair with Umbilical Hernia Repair      Past Surgical History:   Procedure Laterality Date    ELBOW SURGERY Left 7/13/2022    OPEN REDUCTION INTERNAL FIXATION LEFT ELBOW WITH LIGAMENTUS REPAIR  performed by Katya Grijalva MD at [address] [place] [address]  2004    Due to cervical cancer       Current Outpatient Medications:     vitamin D (ERGOCALCIFEROL) 1.25 MG (84612 UT) CAPS capsule, Take 1 capsule by mouth once a week, Disp: 12 capsule, Rfl: 1    Misc. Devices MISC, 1 each by Does not apply route once for 1 dose One walker. , Disp: 1 each, Rfl: 0    diclofenac (VOLTAREN) 50 MG EC tablet, Take 1 tablet by mouth 2 times daily (with meals) (Patient not taking: Reported on 12/19/2022), Disp: 60 tablet, Rfl: 1    Ascorbic Acid (VITAMIN C PO), Take by mouth, Disp: , Rfl:   No Known Allergies  Social History     Socioeconomic History    Marital status:      Spouse name: Not on file    Number of children: Not on file    Years of education: Not on file    Highest education level: Not on file   Occupational History    Not on file   Tobacco Use    Smoking status: Never    Smokeless tobacco: Never   Vaping Use    Vaping Use: Never used   Substance and Sexual Activity    Alcohol use: Yes     Comment: Occasionally 3 drinks per week    Drug use: Never    Sexual activity: Not on file   Other Topics Concern    Not on file   Social History Narrative    ** Merged History Encounter **          Social Determinants of Health     Financial Resource Strain: Not on file   Food Insecurity: Not on file   Transportation Needs: Not on file   Physical Activity: Not on file   Stress: Not on file   Social Connections: Not on file   Intimate Partner Violence: Not on file   Housing Stability: Not on file     History reviewed. No pertinent family history. Skin: (-) rash,(-) psoriasis,(-) eczema, (-)skin cancer. Musculoskeletal: Left elbow stiffness  Neurologic: (-) epilepsy, (-)seizures,(-) brain tumor,(-) TIA, (-)stroke, (-)headaches, (-)Parkinson disease,(-) memory loss, (-) LOC. Cardiovascular: (-) Chest pain, (-) swelling in legs/feet, (-) SOB, (-) cramping in legs/feet with walking. SUBJECTIVE:      Constitutional:    The patient is alert and oriented x 3, appears to be stated age and in no distress. Left upper extremity: Well-healed scar. Elbow extension is -10 degrees. Elbow flexion limited to 120 degrees. Full pronation supination. No pain over the lateral elbow. Positive pain at at terminal elbow flexion extension. Radial, ulnar, median nerves are intact. Motor testing 5/5.  2+ radial pulse    Xrays: X-rays of the left elbow AP and lateral views were obtained today in the office. This demonstrates a stable internal fixation of the capitellum fracture. Impressions office x-rays: Stable fixation left capitellum fracture  Radiographic findings reviewed with patient      Impression: Left elbow stiffness with limited flexion as well as extension  Capitellum fracture status post ORIF    Plan: Today's findings were explained. Her biggest complaint is continued loss of terminal extension as well as limited elbow flexion. She like to incorporate dynamic splinting to improve elbow flexion. Continue with home exercises.

## 2023-01-10 ENCOUNTER — OFFICE VISIT (OUTPATIENT)
Dept: ORTHOPEDIC SURGERY | Age: 43
End: 2023-01-10

## 2023-01-10 DIAGNOSIS — S32.810D CLOSED PELVIC RING FRACTURE WITH ROUTINE HEALING, SUBSEQUENT ENCOUNTER: Primary | ICD-10-CM

## 2023-01-10 RX ORDER — ASPIRIN 81 MG
TABLET, DELAYED RELEASE (ENTERIC COATED) ORAL
COMMUNITY
Start: 2022-07-11

## 2023-01-10 NOTE — PATIENT INSTRUCTIONS
Activity as tolerated    Follow-up on as-needed basis    Call with any questions concerns or need for reevaluation

## 2023-01-10 NOTE — PROGRESS NOTES
Chief Complaint   Patient presents with    Follow-up     3 month f/u Left Pubic bone.    No questions, having some soreness, however overall well.       SUBJECTIVE: Patient is a very pleasant 42-year-old female who is approximately 6 months out from a pelvic ring injury as well as an elbow injury being treated by upper extremity.  She is done well as far as her pelvis.  She gets some left-sided sciatica which is being treated by chiropractor as well some low back pain.  Otherwise she states she is improved significantly.  She walks without assistance and denies any limping.  She denies any recent falls or traumas.    Review of Systems -   General ROS: negative for - chills, fatigue, fever or night sweats  Respiratory ROS: no cough, shortness of breath, or wheezing  Cardiovascular ROS: no chest pain or dyspnea on exertion  Gastrointestinal ROS: no abdominal pain, change in bowel habits, or black or bloody stools  Genitourinary: no hematuria, dysuria, or incontinence   Musculoskeletal ROS:see above  Neurological ROS: no TIA or stroke symptoms       OBJECTIVE:   Alert and oriented X 3, no acute distress, respirations easy and unlabored with no audible wheezes, skin warm and dry, speech and dress appropriate for noted age, affect euthymic.      XR: 1/10/23   X-rays 3 views including AP, inlet, and outlet views show pelvic ring symmetric, no further displacement, there is some callus formation although the superior pubic rami appears to be a chronic nonunion potentially.  Otherwise her films are stable.    There were no vitals taken for this visit.    ASSESSMENT:     Diagnosis Orders   1. Closed pelvic ring fracture with routine healing, subsequent encounter            PLAN:  In detail about the fact that she is having no groin pain or anterior pelvic pain.  This point time her fracture is stable and essentially healed.  There could be a chronic nonunion with fibrous union of her rami although is not causing any symptoms  therefore there is no need for further treatment. She is agreeable to the plan. We will treat this conservatively and have her follow-up on an as-needed basis. All questions were answered to her satisfaction she agreed with treatment plan.     ELECTRONICALLY signed by:    Dawson Dasilva MD  1/10/23

## 2023-05-04 ENCOUNTER — OFFICE VISIT (OUTPATIENT)
Dept: FAMILY MEDICINE CLINIC | Age: 43
End: 2023-05-04
Payer: OTHER GOVERNMENT

## 2023-05-04 VITALS
SYSTOLIC BLOOD PRESSURE: 110 MMHG | HEART RATE: 77 BPM | OXYGEN SATURATION: 98 % | WEIGHT: 143.3 LBS | BODY MASS INDEX: 22.49 KG/M2 | TEMPERATURE: 97.6 F | HEIGHT: 67 IN | DIASTOLIC BLOOD PRESSURE: 62 MMHG

## 2023-05-04 DIAGNOSIS — S99.922A INJURY OF TOE ON LEFT FOOT, INITIAL ENCOUNTER: Primary | ICD-10-CM

## 2023-05-04 PROCEDURE — 99213 OFFICE O/P EST LOW 20 MIN: CPT | Performed by: INTERNAL MEDICINE

## 2023-05-04 RX ORDER — NAPROXEN 500 MG/1
500 TABLET ORAL 2 TIMES DAILY PRN
Qty: 20 TABLET | Refills: 0 | Status: SHIPPED | OUTPATIENT
Start: 2023-05-04

## 2023-05-04 ASSESSMENT — ENCOUNTER SYMPTOMS: COLOR CHANGE: 0
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
## 2023-05-06 ENCOUNTER — TELEPHONE (OUTPATIENT)
Dept: FAMILY MEDICINE CLINIC | Age: 43
End: 2023-05-06

## 2023-05-06 NOTE — TELEPHONE ENCOUNTER
Please let patient know that there is no apparent fracture to her toe. This is a little bit surprising with the amount of pain she was having however nothing was seen. Would continue the protocol that we talked about.   If pain continues would follow-up with PCP and have them consider podiatry referral.

## 2023-07-03 DIAGNOSIS — Z12.31 ENCOUNTER FOR SCREENING MAMMOGRAM FOR BREAST CANCER: Primary | ICD-10-CM

## 2024-07-01 ENCOUNTER — OFFICE VISIT (OUTPATIENT)
Dept: FAMILY MEDICINE CLINIC | Age: 44
End: 2024-07-01
Payer: OTHER GOVERNMENT

## 2024-07-01 ENCOUNTER — TELEPHONE (OUTPATIENT)
Dept: FAMILY MEDICINE CLINIC | Age: 44
End: 2024-07-01

## 2024-07-01 VITALS
RESPIRATION RATE: 15 BRPM | HEIGHT: 67 IN | SYSTOLIC BLOOD PRESSURE: 122 MMHG | BODY MASS INDEX: 23.07 KG/M2 | WEIGHT: 147 LBS | HEART RATE: 89 BPM | OXYGEN SATURATION: 98 % | DIASTOLIC BLOOD PRESSURE: 84 MMHG | TEMPERATURE: 97.5 F

## 2024-07-01 DIAGNOSIS — S41.111D ARM LACERATION, RIGHT, SUBSEQUENT ENCOUNTER: Primary | ICD-10-CM

## 2024-07-01 PROCEDURE — 99213 OFFICE O/P EST LOW 20 MIN: CPT | Performed by: FAMILY MEDICINE
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
## 2024-07-01 NOTE — TELEPHONE ENCOUNTER
What would you advise for Freya?  ------------------------------------------------  Appointment Request From: Freya Storm     With Provider: Zuleika Dhillon DO [[facility]]     Preferred Date Range: 7/1/2024 - 7/1/2024     Preferred Times: Any Time     Reason for visit: Request an Appointment     Comments:  Got dayanna Friday night at care, want her to look at them

## 2024-07-05 ENCOUNTER — OFFICE VISIT (OUTPATIENT)
Dept: FAMILY MEDICINE CLINIC | Age: 44
End: 2024-07-05
Payer: OTHER GOVERNMENT

## 2024-07-05 VITALS
DIASTOLIC BLOOD PRESSURE: 70 MMHG | HEIGHT: 67 IN | WEIGHT: 146 LBS | OXYGEN SATURATION: 97 % | HEART RATE: 75 BPM | TEMPERATURE: 98.5 F | BODY MASS INDEX: 22.91 KG/M2 | SYSTOLIC BLOOD PRESSURE: 100 MMHG

## 2024-07-05 DIAGNOSIS — Z48.02 VISIT FOR SUTURE REMOVAL: ICD-10-CM

## 2024-07-05 DIAGNOSIS — S41.111D LACERATION OF RIGHT UPPER EXTREMITY, SUBSEQUENT ENCOUNTER: Primary | ICD-10-CM

## 2024-07-05 DIAGNOSIS — L08.9 SKIN INFECTION: ICD-10-CM

## 2024-07-05 PROCEDURE — 99213 OFFICE O/P EST LOW 20 MIN: CPT | Performed by: INTERNAL MEDICINE

## 2024-07-05 RX ORDER — CEPHALEXIN 500 MG/1
500 CAPSULE ORAL 3 TIMES DAILY
Qty: 21 CAPSULE | Refills: 0 | Status: SHIPPED | OUTPATIENT
Start: 2024-07-05 | End: 2024-07-12

## 2024-07-05 ASSESSMENT — ENCOUNTER SYMPTOMS
GASTROINTESTINAL NEGATIVE: 1
ROS SKIN COMMENTS: SEE ABOVE
RESPIRATORY NEGATIVE: 1

## 2024-07-06 NOTE — PROGRESS NOTES
Socioeconomic History    Marital status:      Spouse name: Not on file    Number of children: Not on file    Years of education: Not on file    Highest education level: Not on file   Occupational History    Not on file   Tobacco Use    Smoking status: Never    Smokeless tobacco: Never   Vaping Use    Vaping Use: Never used   Substance and Sexual Activity    Alcohol use: Yes     Comment: Occasionally 3 drinks per week    Drug use: Never    Sexual activity: Not on file   Other Topics Concern    Not on file   Social History Narrative    ** Merged History Encounter **          Social Determinants of Health     Financial Resource Strain: Not on file   Food Insecurity: Not on file   Transportation Needs: Not on file   Physical Activity: Not on file   Stress: Not on file   Social Connections: Not on file   Intimate Partner Violence: Not on file   Housing Stability: Not on file       Vitals:    07/05/24 1630   BP: 100/70   Pulse: 75   Temp: 98.5 °F (36.9 °C)   SpO2: 97%   Weight: 66.2 kg (146 lb)   Height: 1.702 m (5' 7\")       Physical Exam  Vitals and nursing note reviewed.   Constitutional:       Appearance: She is not ill-appearing or toxic-appearing.      Comments: Mild discomfort secondary to laceration site pain.   Musculoskeletal:         General: Tenderness present. Normal range of motion.      Comments: Tender around the suture site   Skin:     General: Skin is warm.      Findings: Bruising and erythema present.      Comments: Minimal surrounding erythema to the laceration site.  Sutures appear somewhat taut.  Minimal clear liquid drainage from the site   Neurological:      General: No focal deficit present.      Mental Status: She is alert and oriented to person, place, and time.   Psychiatric:         Mood and Affect: Mood normal.         Behavior: Behavior normal.         Thought Content: Thought content normal.         Judgment: Judgment normal.                 Assessment and Plan:  Freya was seen

## 2024-07-16 DIAGNOSIS — Z12.31 ENCOUNTER FOR SCREENING MAMMOGRAM FOR BREAST CANCER: Primary | ICD-10-CM

## 2024-07-24 ENCOUNTER — TELEPHONE (OUTPATIENT)
Dept: GENERAL RADIOLOGY | Age: 44
End: 2024-07-24

## 2024-07-24 DIAGNOSIS — R92.8 ABNORMAL MAMMOGRAM: Primary | ICD-10-CM

## 2024-07-24 NOTE — TELEPHONE ENCOUNTER
Call to patient in reference to her mammogram performed at [facility] on July 23, 2024.  Instructed patient that the radiologist has recommended some additional breast imaging, in order to make a final determination/result. A  from [facility] will contact her to schedule the additional imaging study/studies. Verbalizes understanding and is agreeable to proceed at [facility].
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
## 2024-08-09 ENCOUNTER — HOSPITAL ENCOUNTER (OUTPATIENT)
Dept: GENERAL RADIOLOGY | Age: 44
Discharge: HOME | End: 2024-08-09
Payer: OTHER GOVERNMENT

## 2024-08-09 ENCOUNTER — HOSPITAL ENCOUNTER (OUTPATIENT)
Dept: GENERAL RADIOLOGY | Age: 44
End: 2024-08-09
Payer: OTHER GOVERNMENT

## 2024-08-09 VITALS — HEIGHT: 67 IN | BODY MASS INDEX: 22.29 KG/M2 | WEIGHT: 142 LBS

## 2024-08-09 DIAGNOSIS — R92.8 ABNORMAL MAMMOGRAM: ICD-10-CM

## 2024-08-09 PROCEDURE — G0279 TOMOSYNTHESIS, MAMMO: HCPCS

## 2024-08-09 PROCEDURE — 76642 ULTRASOUND BREAST LIMITED: CPT

## 2024-08-12 ASSESSMENT — PATIENT HEALTH QUESTIONNAIRE - PHQ9
SUM OF ALL RESPONSES TO PHQ QUESTIONS 1-9: 0
1. LITTLE INTEREST OR PLEASURE IN DOING THINGS: NOT AT ALL
SUM OF ALL RESPONSES TO PHQ9 QUESTIONS 1 & 2: 0
SUM OF ALL RESPONSES TO PHQ QUESTIONS 1-9: 0
1. LITTLE INTEREST OR PLEASURE IN DOING THINGS: NOT AT ALL
SUM OF ALL RESPONSES TO PHQ9 QUESTIONS 1 & 2: 0
2. FEELING DOWN, DEPRESSED OR HOPELESS: NOT AT ALL
2. FEELING DOWN, DEPRESSED OR HOPELESS: NOT AT ALL

## 2024-08-14 ENCOUNTER — OFFICE VISIT (OUTPATIENT)
Dept: FAMILY MEDICINE CLINIC | Age: 44
End: 2024-08-14
Payer: OTHER GOVERNMENT

## 2024-08-14 VITALS
WEIGHT: 143.2 LBS | TEMPERATURE: 98 F | DIASTOLIC BLOOD PRESSURE: 74 MMHG | HEIGHT: 67 IN | HEART RATE: 70 BPM | BODY MASS INDEX: 22.47 KG/M2 | OXYGEN SATURATION: 99 % | SYSTOLIC BLOOD PRESSURE: 118 MMHG

## 2024-08-14 DIAGNOSIS — E55.9 VITAMIN D DEFICIENCY DISEASE: ICD-10-CM

## 2024-08-14 DIAGNOSIS — Z12.11 SCREEN FOR COLON CANCER: ICD-10-CM

## 2024-08-14 DIAGNOSIS — Z00.00 ENCOUNTER FOR WELL ADULT EXAM WITHOUT ABNORMAL FINDINGS: Primary | ICD-10-CM

## 2024-08-14 DIAGNOSIS — E07.9 THYROID DYSFUNCTION: ICD-10-CM

## 2024-08-14 DIAGNOSIS — Z00.00 ENCOUNTER FOR WELL ADULT EXAM WITHOUT ABNORMAL FINDINGS: ICD-10-CM

## 2024-08-14 LAB
ALBUMIN: 4.8 G/DL (ref 3.5–5.2)
ALP BLD-CCNC: 46 U/L (ref 35–104)
ALT SERPL-CCNC: 11 U/L (ref 0–32)
ANION GAP SERPL CALCULATED.3IONS-SCNC: 11 MMOL/L (ref 7–16)
AST SERPL-CCNC: 20 U/L (ref 0–31)
BASOPHILS ABSOLUTE: 0.06 K/UL (ref 0–0.2)
BASOPHILS RELATIVE PERCENT: 1 % (ref 0–2)
BILIRUB SERPL-MCNC: 0.6 MG/DL (ref 0–1.2)
BUN BLDV-MCNC: 11 MG/DL (ref 6–20)
CALCIUM SERPL-MCNC: 9.2 MG/DL (ref 8.6–10.2)
CHLORIDE BLD-SCNC: 103 MMOL/L (ref 98–107)
CHOLESTEROL, TOTAL: 175 MG/DL
CO2: 23 MMOL/L (ref 22–29)
CREAT SERPL-MCNC: 0.9 MG/DL (ref 0.5–1)
EOSINOPHILS ABSOLUTE: 0.14 K/UL (ref 0.05–0.5)
EOSINOPHILS RELATIVE PERCENT: 3 % (ref 0–6)
GFR, ESTIMATED: 78 ML/MIN/1.73M2
GLUCOSE BLD-MCNC: 85 MG/DL (ref 74–99)
HCT VFR BLD CALC: 48.4 % (ref 34–48)
HDLC SERPL-MCNC: 48 MG/DL
HEMOGLOBIN: 15.1 G/DL (ref 11.5–15.5)
IMMATURE GRANULOCYTES %: 0 % (ref 0–5)
IMMATURE GRANULOCYTES ABSOLUTE: <0.03 K/UL (ref 0–0.58)
LDL CHOLESTEROL: 114 MG/DL
LYMPHOCYTES ABSOLUTE: 1.51 K/UL (ref 1.5–4)
LYMPHOCYTES RELATIVE PERCENT: 31 % (ref 20–42)
MCH RBC QN AUTO: 30.9 PG (ref 26–35)
MCHC RBC AUTO-ENTMCNC: 31.2 G/DL (ref 32–34.5)
MCV RBC AUTO: 99.2 FL (ref 80–99.9)
MONOCYTES ABSOLUTE: 0.29 K/UL (ref 0.1–0.95)
MONOCYTES RELATIVE PERCENT: 6 % (ref 2–12)
NEUTROPHILS ABSOLUTE: 2.82 K/UL (ref 1.8–7.3)
NEUTROPHILS RELATIVE PERCENT: 58 % (ref 43–80)
PDW BLD-RTO: 13.2 % (ref 11.5–15)
PLATELET # BLD: 259 K/UL (ref 130–450)
PMV BLD AUTO: 10.2 FL (ref 7–12)
POTASSIUM SERPL-SCNC: 4.6 MMOL/L (ref 3.5–5)
RBC # BLD: 4.88 M/UL (ref 3.5–5.5)
SODIUM BLD-SCNC: 137 MMOL/L (ref 132–146)
TOTAL PROTEIN: 7.6 G/DL (ref 6.4–8.3)
TRIGL SERPL-MCNC: 63 MG/DL
TSH SERPL DL<=0.05 MIU/L-ACNC: 0.86 UIU/ML (ref 0.27–4.2)
VITAMIN D 25-HYDROXY: 88.3 NG/ML (ref 30–100)
VLDLC SERPL CALC-MCNC: 13 MG/DL
WBC # BLD: 4.8 K/UL (ref 4.5–11.5)

## 2024-08-14 PROCEDURE — 99396 PREV VISIT EST AGE 40-64: CPT | Performed by: FAMILY MEDICINE

## 2024-08-14 ASSESSMENT — ENCOUNTER SYMPTOMS
TROUBLE SWALLOWING: 0
VOMITING: 0
WHEEZING: 0
CONSTIPATION: 0
SINUS PAIN: 0
SORE THROAT: 0
COUGH: 0
DIARRHEA: 0
BACK PAIN: 0
EYE PAIN: 0
CHEST TIGHTNESS: 0
NAUSEA: 0
ABDOMINAL PAIN: 0
SHORTNESS OF BREATH: 0
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
## 2024-08-30 LAB — NONINV COLON CA DNA+OCC BLD SCRN STL QL: NEGATIVE

## 2025-07-02 ENCOUNTER — TELEPHONE (OUTPATIENT)
Dept: FAMILY MEDICINE CLINIC | Age: 45
End: 2025-07-02

## 2025-07-02 DIAGNOSIS — Z00.00 ENCOUNTER FOR WELL ADULT EXAM WITHOUT ABNORMAL FINDINGS: Primary | ICD-10-CM

## 2025-08-13 DIAGNOSIS — Z00.00 ENCOUNTER FOR WELL ADULT EXAM WITHOUT ABNORMAL FINDINGS: ICD-10-CM

## 2025-08-13 LAB
ALBUMIN: 4.1 G/DL (ref 3.5–5.2)
ALP BLD-CCNC: 41 U/L (ref 35–104)
ALT SERPL-CCNC: 14 U/L (ref 0–35)
ANION GAP SERPL CALCULATED.3IONS-SCNC: 10 MMOL/L (ref 7–16)
AST SERPL-CCNC: 23 U/L (ref 0–35)
BASOPHILS ABSOLUTE: 0.05 K/UL (ref 0–0.2)
BASOPHILS RELATIVE PERCENT: 1 % (ref 0–2)
BILIRUB SERPL-MCNC: 0.4 MG/DL (ref 0–1.2)
BUN BLDV-MCNC: 13 MG/DL (ref 6–20)
CALCIUM SERPL-MCNC: 9.2 MG/DL (ref 8.6–10)
CHLORIDE BLD-SCNC: 105 MMOL/L (ref 98–107)
CHOLESTEROL, TOTAL: 190 MG/DL
CO2: 23 MMOL/L (ref 22–29)
CREAT SERPL-MCNC: 0.9 MG/DL (ref 0.5–1)
EOSINOPHILS ABSOLUTE: 0.15 K/UL (ref 0.05–0.5)
EOSINOPHILS RELATIVE PERCENT: 4 % (ref 0–6)
GFR, ESTIMATED: 80 ML/MIN/1.73M2
GLUCOSE BLD-MCNC: 99 MG/DL (ref 74–99)
HCT VFR BLD CALC: 43.5 % (ref 34–48)
HDLC SERPL-MCNC: 47 MG/DL
HEMOGLOBIN: 14.3 G/DL (ref 11.5–15.5)
IMMATURE GRANULOCYTES %: 0 % (ref 0–5)
IMMATURE GRANULOCYTES ABSOLUTE: <0.03 K/UL (ref 0–0.58)
LDL CHOLESTEROL: 123 MG/DL
LYMPHOCYTES ABSOLUTE: 1.58 K/UL (ref 1.5–4)
LYMPHOCYTES RELATIVE PERCENT: 37 % (ref 20–42)
MCH RBC QN AUTO: 31.2 PG (ref 26–35)
MCHC RBC AUTO-ENTMCNC: 32.9 G/DL (ref 32–34.5)
MCV RBC AUTO: 94.8 FL (ref 80–99.9)
MONOCYTES ABSOLUTE: 0.3 K/UL (ref 0.1–0.95)
MONOCYTES RELATIVE PERCENT: 7 % (ref 2–12)
NEUTROPHILS ABSOLUTE: 2.16 K/UL (ref 1.8–7.3)
NEUTROPHILS RELATIVE PERCENT: 51 % (ref 43–80)
PDW BLD-RTO: 12.8 % (ref 11.5–15)
PLATELET # BLD: 256 K/UL (ref 130–450)
PMV BLD AUTO: 9.9 FL (ref 7–12)
POTASSIUM SERPL-SCNC: 4.6 MMOL/L (ref 3.5–5.1)
RBC # BLD: 4.59 M/UL (ref 3.5–5.5)
SODIUM BLD-SCNC: 138 MMOL/L (ref 136–145)
TOTAL PROTEIN: 6.7 G/DL (ref 6.4–8.3)
TRIGL SERPL-MCNC: 101 MG/DL
VLDLC SERPL CALC-MCNC: 20 MG/DL
WBC # BLD: 4.2 K/UL (ref 4.5–11.5)

## 2025-08-15 ENCOUNTER — OFFICE VISIT (OUTPATIENT)
Dept: FAMILY MEDICINE CLINIC | Age: 45
End: 2025-08-15
Payer: COMMERCIAL

## 2025-08-15 VITALS
SYSTOLIC BLOOD PRESSURE: 110 MMHG | TEMPERATURE: 97.3 F | DIASTOLIC BLOOD PRESSURE: 70 MMHG | RESPIRATION RATE: 14 BRPM | OXYGEN SATURATION: 97 % | HEIGHT: 67 IN | WEIGHT: 147 LBS | HEART RATE: 70 BPM | BODY MASS INDEX: 23.07 KG/M2

## 2025-08-15 DIAGNOSIS — N95.1 PERIMENOPAUSAL: ICD-10-CM

## 2025-08-15 DIAGNOSIS — E78.2 MIXED HYPERLIPIDEMIA: ICD-10-CM

## 2025-08-15 DIAGNOSIS — Z00.00 ENCOUNTER FOR WELL ADULT EXAM WITHOUT ABNORMAL FINDINGS: Primary | ICD-10-CM

## 2025-08-15 PROCEDURE — 99396 PREV VISIT EST AGE 40-64: CPT | Performed by: FAMILY MEDICINE

## 2025-08-15 RX ORDER — PROGESTERONE 100 MG/1
100 CAPSULE ORAL NIGHTLY
Qty: 90 CAPSULE | Refills: 3 | Status: SHIPPED | OUTPATIENT
Start: 2025-08-15

## 2025-08-15 RX ORDER — ESTRADIOL 0.04 MG/D
1 PATCH, EXTENDED RELEASE TRANSDERMAL
Qty: 24 PATCH | Refills: 3 | Status: SHIPPED | OUTPATIENT
Start: 2025-08-18

## 2025-08-15 ASSESSMENT — PATIENT HEALTH QUESTIONNAIRE - PHQ9
SUM OF ALL RESPONSES TO PHQ QUESTIONS 1-9: 0
SUM OF ALL RESPONSES TO PHQ QUESTIONS 1-9: 0
1. LITTLE INTEREST OR PLEASURE IN DOING THINGS: NOT AT ALL
SUM OF ALL RESPONSES TO PHQ QUESTIONS 1-9: 0
SUM OF ALL RESPONSES TO PHQ QUESTIONS 1-9: 0
2. FEELING DOWN, DEPRESSED OR HOPELESS: NOT AT ALL

## 2025-08-16 ASSESSMENT — ENCOUNTER SYMPTOMS
TROUBLE SWALLOWING: 0
EYE PAIN: 0
SHORTNESS OF BREATH: 0
SINUS PAIN: 0
DIARRHEA: 0
CHEST TIGHTNESS: 0
COUGH: 0
ABDOMINAL PAIN: 0
NAUSEA: 0
CONSTIPATION: 0
VOMITING: 0
BACK PAIN: 0
WHEEZING: 0
SORE THROAT: 0

## (undated) DEVICE — CRADLE ARM W8.75XH12.5XL16IN FOAM SUPP ELEVATION VENT

## (undated) DEVICE — PADDING,UNDERCAST,COTTON, 3X4YD STERILE: Brand: MEDLINE

## (undated) DEVICE — GLOVE SURG SZ 9 L12IN FNGR THK13MIL WHT ISOLEX POLYISOPRENE

## (undated) DEVICE — PENCIL ES L3M BTTN SWCH HOLSTER W/ BLDE ELECTRD EDGE

## (undated) DEVICE — COVER,LIGHT HANDLE,FLX,2/PK: Brand: MEDLINE INDUSTRIES, INC.

## (undated) DEVICE — 3M™ COBAN™ NL STERILE NON-LATEX SELF-ADHERENT WRAP, 2084S, 4 IN X 5 YD (10 CM X 4,5 M), 18 ROLLS/CASE: Brand: 3M™ COBAN™

## (undated) DEVICE — GUIDEWIRE ORTHO 1.1X150 MM TROCAR PT 1 END

## (undated) DEVICE — 4-PORT MANIFOLD: Brand: NEPTUNE 2

## (undated) DEVICE — GOWN,BREATHABLE SLV,AURORA,XLG,STRL: Brand: MEDLINE

## (undated) DEVICE — PADDING,UNDERCAST,COTTON, 4"X4YD STERILE: Brand: MEDLINE

## (undated) DEVICE — DRAPE,U/ SHT,SPLIT,PLAS,STERIL: Brand: MEDLINE

## (undated) DEVICE — APPLICATOR PREP 6ML 0.7% IOD POVACRYLEX 74% ISO ALC

## (undated) DEVICE — SLING ARM L L165IN D75IN WHT POLY MESH ENVELOP MTL SIDE

## (undated) DEVICE — DRILL SYSTEM 7

## (undated) DEVICE — Device

## (undated) DEVICE — GAUZE,SPONGE,AVANT,4"X4",4PLY,STRL,10/TR: Brand: MEDLINE

## (undated) DEVICE — SET ORTHO STD STORTSTD1

## (undated) DEVICE — GLOVE ORANGE PI 8   MSG9080

## (undated) DEVICE — TOTAL TRAY, DB, 100% SILI FOLEY, 16FR 10: Brand: MEDLINE

## (undated) DEVICE — APPLICATOR PREP 26ML 0.7% IOD POVACRYLEX 74% ISO ALC ST

## (undated) DEVICE — IMPLANTABLE DEVICE
Type: IMPLANTABLE DEVICE | Site: ELBOW | Status: NON-FUNCTIONAL
Removed: 2022-07-13

## (undated) DEVICE — BANDAGE COMPR W4INXL10YD WHITE/BEIGE E MTRX HK LOOP CLSR

## (undated) DEVICE — GLOVE ORANGE PI 7 1/2   MSG9075

## (undated) DEVICE — UPPER EXTREMITY: Brand: MEDLINE INDUSTRIES, INC.

## (undated) DEVICE — GLOVE ORANGE PI 8 1/2   MSG9085

## (undated) DEVICE — SET ORTHO STD STORTSTD2

## (undated) DEVICE — SURGICAL PROCEDURE PACK HND

## (undated) DEVICE — DECANTER BAG 9": Brand: MEDLINE INDUSTRIES, INC.

## (undated) DEVICE — DRESSING PETRO W3XL8IN OIL EMUL N ADH GZ KNIT IMPREG CELOS

## (undated) DEVICE — ADHESIVE SKIN CLOSURE 0.7CC TOP MICROBIAL APPL DERMBND ADV

## (undated) DEVICE — DRAPE EQUIP CARM 72X42 IN RUBBER BND CLP

## (undated) DEVICE — GLOVE SURG SZ 9 L12IN FNGR THK126MIL CRM LTX FREE

## (undated) DEVICE — PAD,ABDOMINAL,5"X9",ST,LF,25/BX: Brand: MEDLINE INDUSTRIES, INC.

## (undated) DEVICE — TOWEL,OR,DSP,ST,BLUE,DLX,10/PK,8PK/CS: Brand: MEDLINE

## (undated) DEVICE — SYRINGE IRRIG 60ML SFT PLIABLE BLB EZ TO GRP 1 HND USE W/

## (undated) DEVICE — GUIDEWIRE ORTHOPEDIC 0.8X100 MM TROCAR PT 1 END